# Patient Record
Sex: MALE | Race: WHITE | NOT HISPANIC OR LATINO
[De-identification: names, ages, dates, MRNs, and addresses within clinical notes are randomized per-mention and may not be internally consistent; named-entity substitution may affect disease eponyms.]

---

## 2017-06-08 ENCOUNTER — TRANSCRIPTION ENCOUNTER (OUTPATIENT)
Age: 52
End: 2017-06-08

## 2018-09-09 ENCOUNTER — EMERGENCY (EMERGENCY)
Facility: HOSPITAL | Age: 53
LOS: 1 days | Discharge: HOME | End: 2018-09-09
Admitting: EMERGENCY MEDICINE
Payer: COMMERCIAL

## 2018-09-09 VITALS
HEART RATE: 80 BPM | SYSTOLIC BLOOD PRESSURE: 135 MMHG | TEMPERATURE: 98 F | OXYGEN SATURATION: 99 % | DIASTOLIC BLOOD PRESSURE: 86 MMHG | RESPIRATION RATE: 18 BRPM

## 2018-09-09 LAB
ALBUMIN SERPL ELPH-MCNC: 3.8 G/DL — SIGNIFICANT CHANGE UP (ref 3.4–5)
ALP SERPL-CCNC: 66 U/L — SIGNIFICANT CHANGE UP (ref 40–120)
ALT FLD-CCNC: 56 U/L — HIGH (ref 12–42)
ANION GAP SERPL CALC-SCNC: 5 MMOL/L — LOW (ref 9–16)
APPEARANCE UR: CLEAR — SIGNIFICANT CHANGE UP
AST SERPL-CCNC: 34 U/L — SIGNIFICANT CHANGE UP (ref 15–37)
BILIRUB SERPL-MCNC: 0.5 MG/DL — SIGNIFICANT CHANGE UP (ref 0.2–1.2)
BILIRUB UR-MCNC: NEGATIVE — SIGNIFICANT CHANGE UP
BUN SERPL-MCNC: 13 MG/DL — SIGNIFICANT CHANGE UP (ref 7–23)
CALCIUM SERPL-MCNC: 8.9 MG/DL — SIGNIFICANT CHANGE UP (ref 8.5–10.5)
CHLORIDE SERPL-SCNC: 105 MMOL/L — SIGNIFICANT CHANGE UP (ref 96–108)
CO2 SERPL-SCNC: 30 MMOL/L — SIGNIFICANT CHANGE UP (ref 22–31)
COLOR SPEC: YELLOW — SIGNIFICANT CHANGE UP
CREAT SERPL-MCNC: 0.93 MG/DL — SIGNIFICANT CHANGE UP (ref 0.5–1.3)
DIFF PNL FLD: NEGATIVE — SIGNIFICANT CHANGE UP
GLUCOSE SERPL-MCNC: 105 MG/DL — HIGH (ref 70–99)
GLUCOSE UR QL: NEGATIVE — SIGNIFICANT CHANGE UP
HCT VFR BLD CALC: 45.5 % — SIGNIFICANT CHANGE UP (ref 39–50)
HGB BLD-MCNC: 15.3 G/DL — SIGNIFICANT CHANGE UP (ref 13–17)
KETONES UR-MCNC: NEGATIVE — SIGNIFICANT CHANGE UP
LEUKOCYTE ESTERASE UR-ACNC: NEGATIVE — SIGNIFICANT CHANGE UP
MCHC RBC-ENTMCNC: 29.3 PG — SIGNIFICANT CHANGE UP (ref 27–34)
MCHC RBC-ENTMCNC: 33.6 G/DL — SIGNIFICANT CHANGE UP (ref 32–36)
MCV RBC AUTO: 87.2 FL — SIGNIFICANT CHANGE UP (ref 80–100)
NITRITE UR-MCNC: NEGATIVE — SIGNIFICANT CHANGE UP
PH UR: 7.5 — SIGNIFICANT CHANGE UP (ref 5–8)
PLATELET # BLD AUTO: 191 K/UL — SIGNIFICANT CHANGE UP (ref 150–400)
POTASSIUM SERPL-MCNC: 3.8 MMOL/L — SIGNIFICANT CHANGE UP (ref 3.5–5.3)
POTASSIUM SERPL-SCNC: 3.8 MMOL/L — SIGNIFICANT CHANGE UP (ref 3.5–5.3)
PROT SERPL-MCNC: 7.6 G/DL — SIGNIFICANT CHANGE UP (ref 6.4–8.2)
PROT UR-MCNC: NEGATIVE MG/DL — SIGNIFICANT CHANGE UP
RBC # BLD: 5.22 M/UL — SIGNIFICANT CHANGE UP (ref 4.2–5.8)
RBC # FLD: 12.4 % — SIGNIFICANT CHANGE UP (ref 10.3–16.9)
SODIUM SERPL-SCNC: 140 MMOL/L — SIGNIFICANT CHANGE UP (ref 132–145)
SP GR SPEC: 1.01 — SIGNIFICANT CHANGE UP (ref 1–1.03)
UROBILINOGEN FLD QL: 0.2 E.U./DL — SIGNIFICANT CHANGE UP
WBC # BLD: 7.7 K/UL — SIGNIFICANT CHANGE UP (ref 3.8–10.5)
WBC # FLD AUTO: 7.7 K/UL — SIGNIFICANT CHANGE UP (ref 3.8–10.5)

## 2018-09-09 PROCEDURE — 99283 EMERGENCY DEPT VISIT LOW MDM: CPT

## 2018-09-09 RX ORDER — ONDANSETRON 8 MG/1
4 TABLET, FILM COATED ORAL ONCE
Qty: 0 | Refills: 0 | Status: COMPLETED | OUTPATIENT
Start: 2018-09-09 | End: 2018-09-09

## 2018-09-09 RX ORDER — TAMSULOSIN HYDROCHLORIDE 0.4 MG/1
0.4 CAPSULE ORAL ONCE
Qty: 0 | Refills: 0 | Status: COMPLETED | OUTPATIENT
Start: 2018-09-09 | End: 2018-09-09

## 2018-09-09 RX ORDER — SODIUM CHLORIDE 9 MG/ML
1000 INJECTION INTRAMUSCULAR; INTRAVENOUS; SUBCUTANEOUS ONCE
Qty: 0 | Refills: 0 | Status: COMPLETED | OUTPATIENT
Start: 2018-09-09 | End: 2018-09-09

## 2018-09-09 RX ADMIN — SODIUM CHLORIDE 2000 MILLILITER(S): 9 INJECTION INTRAMUSCULAR; INTRAVENOUS; SUBCUTANEOUS at 13:40

## 2018-09-09 NOTE — ED ADULT TRIAGE NOTE - CHIEF COMPLAINT QUOTE
Pt c/o bilateral flank pain last night. Frequency through out the night. This morning symptoms subsided and pt has been able to urinate without difficulty.

## 2018-09-09 NOTE — ED PROVIDER NOTE - OBJECTIVE STATEMENT
52 yo M with no known PMHx, presenting c/o flank pain and urinary hesitancy x 1d. Pt reports decreased po intake and hydration yesterday and was driving back to [facility] and noticed pain in b/l flank region with urinary hesitancy and dribbling.  Noted waves of sharp pain and nausea. Pain improved PTA to the ED and was able to void freely without any hesitation currently. Denies fever, chills, hematuria, penile d/c, abdominal pain, change in bowel function, flank pain, rash, HA, dizziness, SOB, CP, palpitations, diaphoresis, cough, and malaise.

## 2018-09-09 NOTE — ED ADULT NURSE NOTE - OBJECTIVE STATEMENT
Pt presents to ED c/o episode of b/l flank pain, urinary hesitancy and dribbling. Denies any bloody urine or penile discharge. Denies fever/chills, N/V, dizziness. Pt reports symptoms have resolved.

## 2018-09-09 NOTE — ED PROVIDER NOTE - MEDICAL DECISION MAKING DETAILS
pt with 1d of urinary hesitancy, dribbling, and flank pain, sx resolved PTA to the ED, AFVSS and non toxic appearing, labs and U/A wnl, tolerating po without N/V and voiding freely without discomfort, ?passed stone/renal colic vs BPH, encouraged supportive care, prompt f/u with PMD, strict return precautions discussed, pt verbalized understanding

## 2018-09-09 NOTE — ED ADULT NURSE NOTE - NSIMPLEMENTINTERV_GEN_ALL_ED
Implemented All Universal Safety Interventions:  [place] to call system. Call bell, personal items and telephone within reach. Instruct patient to call for assistance. Room bathroom lighting operational. Non-slip footwear when patient is off stretcher. Physically safe environment: no spills, clutter or unnecessary equipment. Stretcher in lowest position, wheels locked, appropriate side rails in place.

## 2018-09-09 NOTE — ED PROVIDER NOTE - PHYSICAL EXAMINATION
Vital Signs - nursing notes reviewed and confirmed  Gen - WDWN, NAD, comfortable and non-toxic appearing, speaking in full sentences   Skin - warm, dry, intact  HEENT - AT/NC, PERRL, EOMI, no conjunctival injection, moist oral mucosa, o/p clear with no erythema, edema, or exudate, uvula midline, airway patent, neck supple and NT, FROM  CV - S1S2, R/R/R  Resp - respiration non-labored, CTAB, symmetric bs b/l, no r/r/w  GI - NABS, soft, ND, NT, no rebound or guarding, +b/l CVAT   MS - w/w/p, no c/c/e, calves supple and NT, distal pulses symmetric b/l, brisk cap refills   Neuro - AxOx3, no focal neuro deficits, CN II-XII grossly intact, ambulatory without gait disturbance Vital Signs - nursing notes reviewed and confirmed  Gen - WDWN, NAD, comfortable and non-toxic appearing, speaking in full sentences   Skin - warm, dry, intact  HEENT - AT/NC, PERRL, EOMI, no conjunctival injection, dry oral mucosa, o/p clear with no erythema, edema, or exudate, uvula midline, airway patent, neck supple and NT, FROM  CV - S1S2, R/R/R  Resp - respiration non-labored, CTAB, symmetric bs b/l, no r/r/w  GI - NABS, soft, ND, NT, no rebound or guarding, minimal L sided CVAT   MS - w/w/p, no c/c/e, calves supple and NT, distal pulses symmetric b/l, brisk cap refills   Neuro - AxOx3, no focal neuro deficits, CN II-XII grossly intact, ambulatory without gait disturbance

## 2018-09-13 DIAGNOSIS — N23 UNSPECIFIED RENAL COLIC: ICD-10-CM

## 2018-09-13 DIAGNOSIS — R10.9 UNSPECIFIED ABDOMINAL PAIN: ICD-10-CM

## 2018-09-26 ENCOUNTER — APPOINTMENT (OUTPATIENT)
Dept: UROLOGY | Facility: CLINIC | Age: 53
End: 2018-09-26

## 2018-09-27 ENCOUNTER — APPOINTMENT (OUTPATIENT)
Dept: UROLOGY | Facility: CLINIC | Age: 53
End: 2018-09-27

## 2018-10-01 ENCOUNTER — APPOINTMENT (OUTPATIENT)
Dept: UROLOGY | Facility: CLINIC | Age: 53
End: 2018-10-01
Payer: COMMERCIAL

## 2018-10-01 VITALS
WEIGHT: 178 LBS | HEART RATE: 76 BPM | HEIGHT: 71 IN | OXYGEN SATURATION: 97 % | BODY MASS INDEX: 24.92 KG/M2 | TEMPERATURE: 98.3 F | SYSTOLIC BLOOD PRESSURE: 123 MMHG | DIASTOLIC BLOOD PRESSURE: 79 MMHG

## 2018-10-01 VITALS — WEIGHT: 172 LBS | BODY MASS INDEX: 23.99 KG/M2

## 2018-10-01 DIAGNOSIS — I25.2 OLD MYOCARDIAL INFARCTION: ICD-10-CM

## 2018-10-01 DIAGNOSIS — N52.9 MALE ERECTILE DYSFUNCTION, UNSPECIFIED: ICD-10-CM

## 2018-10-01 PROCEDURE — 51798 US URINE CAPACITY MEASURE: CPT

## 2018-10-01 PROCEDURE — 99204 OFFICE O/P NEW MOD 45 MIN: CPT | Mod: 25

## 2018-10-05 LAB
BACTERIA UR CULT: NORMAL
PSA FREE FLD-MCNC: 16.8
PSA FREE SERPL-MCNC: 0.56 NG/ML
PSA SERPL-MCNC: 3.33 NG/ML

## 2018-10-25 ENCOUNTER — APPOINTMENT (OUTPATIENT)
Dept: UROLOGY | Facility: CLINIC | Age: 53
End: 2018-10-25

## 2018-12-05 ENCOUNTER — APPOINTMENT (OUTPATIENT)
Dept: UROLOGY | Facility: CLINIC | Age: 53
End: 2018-12-05

## 2019-01-25 ENCOUNTER — APPOINTMENT (OUTPATIENT)
Dept: UROLOGY | Facility: CLINIC | Age: 54
End: 2019-01-25
Payer: COMMERCIAL

## 2019-01-25 VITALS
SYSTOLIC BLOOD PRESSURE: 120 MMHG | TEMPERATURE: 98.4 F | HEIGHT: 71 IN | WEIGHT: 172 LBS | BODY MASS INDEX: 24.08 KG/M2 | DIASTOLIC BLOOD PRESSURE: 78 MMHG | HEART RATE: 86 BPM

## 2019-01-25 PROCEDURE — 99214 OFFICE O/P EST MOD 30 MIN: CPT

## 2019-01-25 NOTE — PHYSICAL EXAM
[General Appearance - Well Developed] : well developed [General Appearance - Well Nourished] : well nourished [Normal Appearance] : normal appearance [Well Groomed] : well groomed [Abdomen Soft] : soft [Abdomen Tenderness] : non-tender [Abdomen Hernia] : no hernia was discovered [Costovertebral Angle Tenderness] : no ~M costovertebral angle tenderness [Urethral Meatus] : meatus normal [Penis Abnormality] : normal circumcised penis [Urinary Bladder Findings] : the bladder was normal on palpation [Scrotum] : the scrotum was normal [Epididymis] : the epididymides were normal [Testes Tenderness] : no tenderness of the testes [Testes Mass (___cm)] : there were no testicular masses [FreeTextEntry1] : left grade I varicocele 16cc x 2. [Skin Color & Pigmentation] : normal skin color and pigmentation [Heart Rate And Rhythm] : Heart rate and rhythm were normal [] : no respiratory distress [Oriented To Time, Place, And Person] : oriented to person, place, and time [Normal Station and Gait] : the gait and station were normal for the patient's age [No Focal Deficits] : no focal deficits [No Palpable Adenopathy] : no palpable adenopathy

## 2019-01-25 NOTE — HISTORY OF PRESENT ILLNESS
[FreeTextEntry1] : comes in with abnromal SA\par 1ml/14 mil/ml/14% motility/2% morphology\par girlfriend is 41F \par no children either partner\par not actilvely attemtping pregnancy\par \par starting this monring with gross hematuria\par no dysuria\par no fevers\par no flank pain\par +previous smoking history 4 pack year quit 25 years\par

## 2019-01-25 NOTE — ASSESSMENT
[FreeTextEntry1] : hypogonadsim\par T labs\par scrotal sonogram\par refer to IVF\par \par gross hematuria\par UA UCX cytology\par cysto\par CT urogram

## 2019-01-28 LAB
APPEARANCE: ABNORMAL
BACTERIA UR CULT: NORMAL
BACTERIA: NEGATIVE
BILIRUBIN URINE: ABNORMAL
BLOOD URINE: ABNORMAL
COLOR: ABNORMAL
GLUCOSE QUALITATIVE U: NEGATIVE
HYALINE CASTS: 0 /LPF
KETONES URINE: NEGATIVE
LEUKOCYTE ESTERASE URINE: ABNORMAL
MICROSCOPIC-UA: NORMAL
NITRITE URINE: NEGATIVE
PH URINE: 6
PROTEIN URINE: 30
RED BLOOD CELLS URINE: >720 /HPF
SPECIFIC GRAVITY URINE: 1.03
SQUAMOUS EPITHELIAL CELLS: 2 /HPF
UROBILINOGEN URINE: NEGATIVE
WHITE BLOOD CELLS URINE: 18 /HPF

## 2019-01-30 LAB — URINE CYTOLOGY: NORMAL

## 2019-02-03 ENCOUNTER — FORM ENCOUNTER (OUTPATIENT)
Age: 54
End: 2019-02-03

## 2019-02-04 ENCOUNTER — OUTPATIENT (OUTPATIENT)
Dept: OUTPATIENT SERVICES | Facility: HOSPITAL | Age: 54
LOS: 1 days | End: 2019-02-04
Payer: COMMERCIAL

## 2019-02-04 ENCOUNTER — APPOINTMENT (OUTPATIENT)
Dept: CT IMAGING | Facility: HOSPITAL | Age: 54
End: 2019-02-04
Payer: COMMERCIAL

## 2019-02-04 ENCOUNTER — APPOINTMENT (OUTPATIENT)
Dept: ULTRASOUND IMAGING | Facility: HOSPITAL | Age: 54
End: 2019-02-04
Payer: COMMERCIAL

## 2019-02-04 PROCEDURE — 76870 US EXAM SCROTUM: CPT | Mod: 26

## 2019-02-04 PROCEDURE — 74178 CT ABD&PLV WO CNTR FLWD CNTR: CPT | Mod: 26

## 2019-02-04 PROCEDURE — 74178 CT ABD&PLV WO CNTR FLWD CNTR: CPT

## 2019-02-04 PROCEDURE — 76870 US EXAM SCROTUM: CPT

## 2019-02-06 ENCOUNTER — RESULT REVIEW (OUTPATIENT)
Age: 54
End: 2019-02-06

## 2019-02-08 ENCOUNTER — APPOINTMENT (OUTPATIENT)
Dept: UROLOGY | Facility: CLINIC | Age: 54
End: 2019-02-08
Payer: COMMERCIAL

## 2019-02-08 VITALS — SYSTOLIC BLOOD PRESSURE: 135 MMHG | TEMPERATURE: 98.5 F | HEART RATE: 74 BPM | DIASTOLIC BLOOD PRESSURE: 81 MMHG

## 2019-02-08 DIAGNOSIS — R39.198 OTHER DIFFICULTIES WITH MICTURITION: ICD-10-CM

## 2019-02-08 PROCEDURE — 52000 CYSTOURETHROSCOPY: CPT

## 2019-02-08 PROCEDURE — 99213 OFFICE O/P EST LOW 20 MIN: CPT | Mod: 25

## 2019-02-08 RX ORDER — TADALAFIL 2.5 MG/1
2.5 TABLET, FILM COATED ORAL
Qty: 30 | Refills: 11 | Status: ACTIVE | COMMUNITY
Start: 2019-02-08 | End: 1900-01-01

## 2019-02-08 NOTE — HISTORY OF PRESENT ILLNESS
[FreeTextEntry1] : see attached cysto\par bothersome LUTS\par attempting conception\par await T labs\par small varicocele on sonogram

## 2019-02-08 NOTE — ASSESSMENT
[FreeTextEntry1] : BPH\par trial cialis 2.5\par \par hypogoandsim\par await T labs\par recommend IVF

## 2019-03-08 ENCOUNTER — APPOINTMENT (OUTPATIENT)
Dept: UROLOGY | Facility: CLINIC | Age: 54
End: 2019-03-08

## 2019-06-06 NOTE — ED ADULT TRIAGE NOTE - BP NONINVASIVE SYSTOLIC (MM HG)
Pt to specials via cart. No acute distress noted at this time. No further complaints. Tele pack intact. Monitor room aware.       Sandra Chirinos RN  06/06/19 0022 [id number]

## 2020-01-30 ENCOUNTER — EMERGENCY (EMERGENCY)
Facility: HOSPITAL | Age: 55
LOS: 1 days | Discharge: HOME | End: 2020-01-30
Admitting: EMERGENCY MEDICINE
Payer: COMMERCIAL

## 2020-01-30 VITALS
DIASTOLIC BLOOD PRESSURE: 83 MMHG | OXYGEN SATURATION: 98 % | HEART RATE: 83 BPM | SYSTOLIC BLOOD PRESSURE: 155 MMHG | RESPIRATION RATE: 18 BRPM

## 2020-01-30 VITALS
SYSTOLIC BLOOD PRESSURE: 143 MMHG | HEART RATE: 76 BPM | RESPIRATION RATE: 18 BRPM | TEMPERATURE: 98 F | HEIGHT: 61 IN | WEIGHT: 175.05 LBS | DIASTOLIC BLOOD PRESSURE: 90 MMHG

## 2020-01-30 DIAGNOSIS — R07.9 CHEST PAIN, UNSPECIFIED: ICD-10-CM

## 2020-01-30 DIAGNOSIS — R33.9 RETENTION OF URINE, UNSPECIFIED: ICD-10-CM

## 2020-01-30 LAB
ALBUMIN SERPL ELPH-MCNC: 4 G/DL — SIGNIFICANT CHANGE UP (ref 3.4–5)
ALP SERPL-CCNC: 71 U/L — SIGNIFICANT CHANGE UP (ref 40–120)
ALT FLD-CCNC: 97 U/L — HIGH (ref 12–42)
ANION GAP SERPL CALC-SCNC: 8 MMOL/L — LOW (ref 9–16)
APPEARANCE UR: CLEAR — SIGNIFICANT CHANGE UP
AST SERPL-CCNC: 46 U/L — HIGH (ref 15–37)
BASOPHILS # BLD AUTO: 0.02 K/UL — SIGNIFICANT CHANGE UP (ref 0–0.2)
BASOPHILS NFR BLD AUTO: 0.2 % — SIGNIFICANT CHANGE UP (ref 0–2)
BILIRUB SERPL-MCNC: 0.5 MG/DL — SIGNIFICANT CHANGE UP (ref 0.2–1.2)
BILIRUB UR-MCNC: NEGATIVE — SIGNIFICANT CHANGE UP
BUN SERPL-MCNC: 17 MG/DL — SIGNIFICANT CHANGE UP (ref 7–23)
CALCIUM SERPL-MCNC: 9.5 MG/DL — SIGNIFICANT CHANGE UP (ref 8.5–10.5)
CHLORIDE SERPL-SCNC: 105 MMOL/L — SIGNIFICANT CHANGE UP (ref 96–108)
CK MB BLD-MCNC: 0.66 % — SIGNIFICANT CHANGE UP
CK MB CFR SERPL CALC: 1.3 NG/ML — SIGNIFICANT CHANGE UP (ref 0.5–3.6)
CK SERPL-CCNC: 198 U/L — SIGNIFICANT CHANGE UP (ref 39–308)
CO2 SERPL-SCNC: 28 MMOL/L — SIGNIFICANT CHANGE UP (ref 22–31)
COLOR SPEC: YELLOW — SIGNIFICANT CHANGE UP
CREAT SERPL-MCNC: 0.97 MG/DL — SIGNIFICANT CHANGE UP (ref 0.5–1.3)
DIFF PNL FLD: NEGATIVE — SIGNIFICANT CHANGE UP
EOSINOPHIL # BLD AUTO: 0.25 K/UL — SIGNIFICANT CHANGE UP (ref 0–0.5)
EOSINOPHIL NFR BLD AUTO: 2.7 % — SIGNIFICANT CHANGE UP (ref 0–6)
FLU A RESULT: SIGNIFICANT CHANGE UP
FLU A RESULT: SIGNIFICANT CHANGE UP
FLUAV AG NPH QL: SIGNIFICANT CHANGE UP
FLUBV AG NPH QL: SIGNIFICANT CHANGE UP
GLUCOSE SERPL-MCNC: 117 MG/DL — HIGH (ref 70–99)
GLUCOSE UR QL: NEGATIVE — SIGNIFICANT CHANGE UP
HCT VFR BLD CALC: 43.7 % — SIGNIFICANT CHANGE UP (ref 39–50)
HGB BLD-MCNC: 14.7 G/DL — SIGNIFICANT CHANGE UP (ref 13–17)
IMM GRANULOCYTES NFR BLD AUTO: 0.1 % — SIGNIFICANT CHANGE UP (ref 0–1.5)
KETONES UR-MCNC: ABNORMAL MG/DL
LEUKOCYTE ESTERASE UR-ACNC: NEGATIVE — SIGNIFICANT CHANGE UP
LIDOCAIN IGE QN: 133 U/L — SIGNIFICANT CHANGE UP (ref 73–393)
LYMPHOCYTES # BLD AUTO: 1.29 K/UL — SIGNIFICANT CHANGE UP (ref 1–3.3)
LYMPHOCYTES # BLD AUTO: 13.9 % — SIGNIFICANT CHANGE UP (ref 13–44)
MAGNESIUM SERPL-MCNC: 1.9 MG/DL — SIGNIFICANT CHANGE UP (ref 1.6–2.6)
MCHC RBC-ENTMCNC: 29.1 PG — SIGNIFICANT CHANGE UP (ref 27–34)
MCHC RBC-ENTMCNC: 33.6 GM/DL — SIGNIFICANT CHANGE UP (ref 32–36)
MCV RBC AUTO: 86.4 FL — SIGNIFICANT CHANGE UP (ref 80–100)
MONOCYTES # BLD AUTO: 0.62 K/UL — SIGNIFICANT CHANGE UP (ref 0–0.9)
MONOCYTES NFR BLD AUTO: 6.7 % — SIGNIFICANT CHANGE UP (ref 2–14)
NEUTROPHILS # BLD AUTO: 7.08 K/UL — SIGNIFICANT CHANGE UP (ref 1.8–7.4)
NEUTROPHILS NFR BLD AUTO: 76.4 % — SIGNIFICANT CHANGE UP (ref 43–77)
NITRITE UR-MCNC: NEGATIVE — SIGNIFICANT CHANGE UP
NRBC # BLD: 0 /100 WBCS — SIGNIFICANT CHANGE UP (ref 0–0)
NT-PROBNP SERPL-SCNC: 40 PG/ML — SIGNIFICANT CHANGE UP
PH UR: 8.5 — HIGH (ref 5–8)
PLATELET # BLD AUTO: 170 K/UL — SIGNIFICANT CHANGE UP (ref 150–400)
POTASSIUM SERPL-MCNC: 3.6 MMOL/L — SIGNIFICANT CHANGE UP (ref 3.5–5.3)
POTASSIUM SERPL-SCNC: 3.6 MMOL/L — SIGNIFICANT CHANGE UP (ref 3.5–5.3)
PROT SERPL-MCNC: 7.5 G/DL — SIGNIFICANT CHANGE UP (ref 6.4–8.2)
PROT UR-MCNC: NEGATIVE MG/DL — SIGNIFICANT CHANGE UP
RBC # BLD: 5.06 M/UL — SIGNIFICANT CHANGE UP (ref 4.2–5.8)
RBC # FLD: 12.7 % — SIGNIFICANT CHANGE UP (ref 10.3–14.5)
RSV RESULT: SIGNIFICANT CHANGE UP
RSV RNA RESP QL NAA+PROBE: SIGNIFICANT CHANGE UP
SODIUM SERPL-SCNC: 141 MMOL/L — SIGNIFICANT CHANGE UP (ref 132–145)
SP GR SPEC: 1.01 — SIGNIFICANT CHANGE UP (ref 1–1.03)
TROPONIN I SERPL-MCNC: <0.017 NG/ML — LOW (ref 0.02–0.06)
UROBILINOGEN FLD QL: 0.2 E.U./DL — SIGNIFICANT CHANGE UP
WBC # BLD: 9.27 K/UL — SIGNIFICANT CHANGE UP (ref 3.8–10.5)
WBC # FLD AUTO: 9.27 K/UL — SIGNIFICANT CHANGE UP (ref 3.8–10.5)

## 2020-01-30 PROCEDURE — 71045 X-RAY EXAM CHEST 1 VIEW: CPT | Mod: 26

## 2020-01-30 PROCEDURE — 99285 EMERGENCY DEPT VISIT HI MDM: CPT

## 2020-01-30 PROCEDURE — 93010 ELECTROCARDIOGRAM REPORT: CPT

## 2020-01-30 RX ORDER — ASPIRIN/CALCIUM CARB/MAGNESIUM 324 MG
162 TABLET ORAL ONCE
Refills: 0 | Status: COMPLETED | OUTPATIENT
Start: 2020-01-30 | End: 2020-01-30

## 2020-01-30 RX ORDER — SODIUM CHLORIDE 9 MG/ML
1000 INJECTION INTRAMUSCULAR; INTRAVENOUS; SUBCUTANEOUS ONCE
Refills: 0 | Status: COMPLETED | OUTPATIENT
Start: 2020-01-30 | End: 2020-01-30

## 2020-01-30 RX ADMIN — Medication 162 MILLIGRAM(S): at 12:51

## 2020-01-30 RX ADMIN — SODIUM CHLORIDE 1000 MILLILITER(S): 9 INJECTION INTRAMUSCULAR; INTRAVENOUS; SUBCUTANEOUS at 12:51

## 2020-01-30 NOTE — ED PROVIDER NOTE - OBJECTIVE STATEMENT
55 y/o M with PMHx "MI in 2003", however states he had a negative catheter with no stents placed and was told it was most likely vasospasm. He follows with cardiologist Dr Granados ([phone]) regularly. He also has Hx HLD and is on a statin, takes aspirin daily, and is a non-smoker. He presents to the ED c/o generalized weakness today with dry mouth, cold feet, tightness around the neck, and soreness to b/l shoulders and elbows R>L. Pt denies current chest pain and feels dehydrated and anxious. 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 Of note, pt took 2 baby aspirin PTA. Denies recent travel, cocaine use, other illicit drug use, FHx significant cardiac events, or exertional sx. Denies fever, chills, N/V, abd pain, SOB, back pain, or neck pain. 55 y/o M with PMHx "MI in 2003", however states he had a negative cardiac catheter with no stents placed and was told it was most likely vasospasm. He follows with cardiologist Dr Granados ([phone]) regularly. He also has Hx HLD and is on a statin, takes aspirin daily, and is a non-smoker. He presents to the ED c/o generalized weakness today with dry mouth, cold feet, tightness around the neck, and soreness to b/l shoulders and elbows R>L. Pt denies current chest pain and feels dehydrated and anxious. 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 Of note, pt took 2 baby aspirin PTA. Denies recent travel, cocaine use, other illicit drug use, FHx significant cardiac events, or exertional sx. Denies fever, chills, N/V, abd pain, SOB, back pain, or neck pain. 53 y/o M with PMHx "MI in 2003" -- states he had a negative cardiac catheter with no stents placed, was told it was most likely vasospasm (had positive trop in the past). follows with cardiologist Dr Granados ([phone]) regularly.  Hx HLD and is on a statin, takes aspirin daily, non-smoker. hx enlarged prostate.  c/o generalized weakness today with dry mouth, cold feet, tightness around the neck, and soreness to b/l shoulders and elbows R>L. Pt denies current chest pain and feels dehydrated and anxious. 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 Of note, pt took 2 baby aspirin PTA. Denies recent travel, cocaine use, other illicit drug use, FHx significant cardiac events, or exertional sx. Denies fever, chills, N/V, abd pain, SOB, back pain, or neck pain.

## 2020-01-30 NOTE — ED ADULT NURSE NOTE - CHPI ED NUR SYMPTOMS NEG
no chills/no fever/no nausea/no syncope/no diaphoresis/no vomiting/no congestion/no back pain/no chest pain

## 2020-01-30 NOTE — ED ADULT TRIAGE NOTE - CHIEF COMPLAINT QUOTE
pt biba for c/o chest/shoulder/right arm pain, accompanied by weakness and nausea. took two baby ASA at home. hx hyperlipidemia and MI. ekg done in field and unremarkable.

## 2020-01-30 NOTE — ED ADULT NURSE NOTE - NS ED NOTE ABUSE SUSPICION NEGLECT YN
9/5/2019    Patient: Karis Briceño    YOB: 1942    To whom it may concern,    Karis Briceño was admitted to the hospital under my care on 9/3/2019 and was discharged on 9/5/2019.  Cleared to return to work on Monday 9/9/2019 without restrictions.     If you have any questions or concerns, please don't hesitate to contact the department of hospital medicine at [phone]    Sincerely,            Sophie [facility]Cooper [facility]  Department of Hospital Medicine  
No

## 2020-01-30 NOTE — ED PROVIDER NOTE - CHPI ED SYMPTOMS POS
COUGH/WEAKNESS/dry mouth, cold feet, neck tightness, shoulder and elbow soreness, dehyrdrated, anxious

## 2020-01-30 NOTE — ED PROVIDER NOTE - PROGRESS NOTE DETAILS
after 1L NS in ED, patient noted to have urinary retention, patient unable to void on own. bedside US shows full bladder. patient recommended forde, patient wants to wait and thinks this may have been due to constipation however went to bathroom and had BM, still unable to urinate. history of enlarged prostate on previous CT. patient eventually agreed to straight cath, straight cath with 500cc urine returned, tolerating PO. refused forde with leg bag, he understands straight cath is temporizing measure and most likely will need to return later to ED for forde as he may experience urinary retention when he goes home. he verbalized understanding and wishes to go home, partner at bedside present. he states he will follow up with his urologist. after 1L NS in ED, patient c/o inability to urinate, noted to have urinary retention, patient unable to void on own. bedside US shows full bladder. patient recommended forde, patient wants to wait and thinks this may have been due to constipation however went to bathroom and had BM, still unable to urinate. history of enlarged prostate on previous CT. patient eventually agreed to straight cath, straight cath with 500cc urine returned, tolerating PO. refused forde with leg bag, he understands straight cath is temporizing measure and most likely will need to return later to ED for forde as he may experience urinary retention when he goes home. he verbalized understanding and wishes to go home, partner at bedside present. he states he will follow up with his urologist.

## 2020-01-30 NOTE — ED PROVIDER NOTE - NSFOLLOWUPINSTRUCTIONS_ED_ALL_ED_FT
Please follow up with your urologist as soon as possible for further evaluation  Please follow up with your cardiologist and/or primary care provider as well    RETURN TO THE EMERGENCY DEPARTMENT FOR ANY CONCERNING OR WORSENING SYMPTOMS INCLUDING UNABLE TO URINATE, FEVER, VOMITING, WORSENING CHEST PAIN, SHORTNESS OF BREATH OR ANY CONCERNS.

## 2020-01-30 NOTE — ED PROVIDER NOTE - PATIENT PORTAL LINK FT
You can access the FollowMyHealth Patient Portal offered by [facility] by registering at the following website: [email or web address]. By joining [employer]’s FollowMyHealth portal, you will also be able to view your health information using other applications (apps) compatible with our system.

## 2020-01-30 NOTE — ED ADULT NURSE NOTE - NSIMPLEMENTINTERV_GEN_ALL_ED
Implemented All Universal Safety Interventions:  [place] to call system. Call bell, personal items and telephone within reach. Instruct patient to call for assistance. Room bathroom lighting operational. Non-slip footwear when patient is off stretcher. Physically safe environment: no spills, clutter or unnecessary equipment. Stretcher in lowest position, wheels locked, appropriate side rails in place.

## 2020-01-30 NOTE — ED ADULT NURSE NOTE - OBJECTIVE STATEMENT
55 yo M c.o chest tightness. Pt state "I was fixing light fixtures in the house and then I started having soreness in my arms so I sat down to relax and then I started feeling panicky and started feeling tightness in my chest and dizzy, I had a heart attack in 2013 so I was scared and come in". Pt denies  tingling to bl upper and lower extremities at this time. speaking full sentences without difficulty. Telemetry and cont pulse ox monitoring initiated. Will continue to monitor.

## 2020-01-31 ENCOUNTER — APPOINTMENT (OUTPATIENT)
Dept: UROLOGY | Facility: CLINIC | Age: 55
End: 2020-01-31
Payer: COMMERCIAL

## 2020-01-31 VITALS — HEART RATE: 86 BPM | SYSTOLIC BLOOD PRESSURE: 114 MMHG | DIASTOLIC BLOOD PRESSURE: 70 MMHG | TEMPERATURE: 98.4 F

## 2020-01-31 PROBLEM — I21.9 ACUTE MYOCARDIAL INFARCTION, UNSPECIFIED: Chronic | Status: ACTIVE | Noted: 2020-01-30

## 2020-01-31 PROCEDURE — 99213 OFFICE O/P EST LOW 20 MIN: CPT

## 2020-01-31 NOTE — ASSESSMENT
[FreeTextEntry1] : BPH\par retention\par trial alfuzosin\par will do PSA testing in 6-8 weeks given cath yesterday\par \par left varicocele\par considering pregnancy with his girlfriend\par i recommend IVF\par they are unclear if they want to go down the IVF route\par will discuss and get back toit

## 2020-01-31 NOTE — HISTORY OF PRESENT ILLNESS
[FreeTextEntry1] : seen in ER yesterday with panic attack\par given IV fluids\par went into retention\par straight cath 700+cc residual noted\par now voiding more comfortably\par previous cysto with large intravescial lobe and large lateral lobes\par PSA 10/2018 3.3\par not routinely taking cialis\par partner is 42F still considering pregnancy\par hx OATS\par

## 2020-02-03 ENCOUNTER — TRANSCRIPTION ENCOUNTER (OUTPATIENT)
Age: 55
End: 2020-02-03

## 2020-02-28 ENCOUNTER — APPOINTMENT (OUTPATIENT)
Dept: UROLOGY | Facility: CLINIC | Age: 55
End: 2020-02-28

## 2020-09-25 ENCOUNTER — EMERGENCY (EMERGENCY)
Facility: HOSPITAL | Age: 55
LOS: 1 days | Discharge: HOME | End: 2020-09-25
Attending: EMERGENCY MEDICINE | Admitting: EMERGENCY MEDICINE
Payer: COMMERCIAL

## 2020-09-25 VITALS
SYSTOLIC BLOOD PRESSURE: 150 MMHG | HEART RATE: 88 BPM | OXYGEN SATURATION: 95 % | DIASTOLIC BLOOD PRESSURE: 100 MMHG | WEIGHT: 171.96 LBS | HEIGHT: 71 IN | TEMPERATURE: 98 F | RESPIRATION RATE: 16 BRPM

## 2020-09-25 PROBLEM — E78.5 HYPERLIPIDEMIA, UNSPECIFIED: Chronic | Status: ACTIVE | Noted: 2020-01-30

## 2020-09-25 PROCEDURE — 99284 EMERGENCY DEPT VISIT MOD MDM: CPT

## 2020-09-25 RX ORDER — GABAPENTIN 400 MG/1
1 CAPSULE ORAL
Qty: 21 | Refills: 0
Start: 2020-09-25 | End: 2020-10-01

## 2020-09-25 RX ORDER — OXYCODONE AND ACETAMINOPHEN 5; 325 MG/1; MG/1
1 TABLET ORAL ONCE
Refills: 0 | Status: DISCONTINUED | OUTPATIENT
Start: 2020-09-25 | End: 2020-09-25

## 2020-09-25 RX ORDER — LIDOCAINE 4 G/100G
1 CREAM TOPICAL ONCE
Refills: 0 | Status: COMPLETED | OUTPATIENT
Start: 2020-09-25 | End: 2020-09-25

## 2020-09-25 RX ORDER — IBUPROFEN 200 MG
1 TABLET ORAL
Qty: 15 | Refills: 0
Start: 2020-09-25 | End: 2020-09-29

## 2020-09-25 RX ORDER — IBUPROFEN 200 MG
800 TABLET ORAL ONCE
Refills: 0 | Status: COMPLETED | OUTPATIENT
Start: 2020-09-25 | End: 2020-09-25

## 2020-09-25 RX ORDER — CYCLOBENZAPRINE HYDROCHLORIDE 10 MG/1
1 TABLET, FILM COATED ORAL
Qty: 15 | Refills: 0
Start: 2020-09-25 | End: 2020-09-29

## 2020-09-25 RX ORDER — CYCLOBENZAPRINE HYDROCHLORIDE 10 MG/1
10 TABLET, FILM COATED ORAL ONCE
Refills: 0 | Status: COMPLETED | OUTPATIENT
Start: 2020-09-25 | End: 2020-09-25

## 2020-09-25 RX ADMIN — LIDOCAINE 1 PATCH: 4 CREAM TOPICAL at 11:52

## 2020-09-25 RX ADMIN — CYCLOBENZAPRINE HYDROCHLORIDE 10 MILLIGRAM(S): 10 TABLET, FILM COATED ORAL at 11:52

## 2020-09-25 RX ADMIN — Medication 800 MILLIGRAM(S): at 11:52

## 2020-09-25 RX ADMIN — Medication 50 MILLIGRAM(S): at 11:53

## 2020-09-25 RX ADMIN — OXYCODONE AND ACETAMINOPHEN 1 TABLET(S): 5; 325 TABLET ORAL at 11:52

## 2020-09-25 NOTE — ED PROVIDER NOTE - NSFOLLOWUPINSTRUCTIONS_ED_ALL_ED_FT
Log Out.      [employer] CareNotes®     :  [facility]  	                       LUMBAR RADICULOPATHY - AfterCare(R) Instructions(ER/ED)           Lumbar Radiculopathy    WHAT YOU NEED TO KNOW:    Lumbar radiculopathy is a painful condition that happens when a nerve in your lumbar spine (lower back) is pinched or irritated. Nerves control feeling and movement in your body. You may have numbness or pain that shoots down from your lower back towards your foot.    DISCHARGE INSTRUCTIONS:    Medicines:   •Medicines:?NSAIDs, such as ibuprofen, help decrease swelling, pain, and fever. This medicine is available with or without a doctor's order. NSAIDs can cause stomach bleeding or kidney problems in certain people. If you take blood thinner medicine, always ask your healthcare provider if NSAIDs are safe for you. Always read the medicine label and follow directions.      ?Muscle relaxers help decrease pain and muscle spasms.      ?Opioids: This is a strong medicine given to reduce severe pain. It is also called narcotic pain medicine. Take this medicine exactly as directed by your healthcare provider.      ?Oral steroids: Steroids may also be given to reduce pain and swelling.      ?Take your medicine as directed. Contact your healthcare provider if you think your medicine is not helping or if you have side effects. Tell him of her if you are allergic to any medicine. Keep a list of the medicines, vitamins, and herbs you take. Include the amounts, and when and why you take them. Bring the list or the pill bottles to follow-up visits. Carry your medicine list with you in case of an emergency.          Follow up with your healthcare provider or spine specialist within 1 to 3 weeks: After your first follow-up appointment, return to your healthcare provider or spine specialist every 2 weeks until you have healed. Ask for information about physical therapy for your condition. Write down your questions so you remember to ask them during your visits.    Physical therapy: You may need physical therapy to improve your condition. Your physical therapist may teach you certain exercises to improve posture (the way you stand and sit), flexibility, and strength in your lower back.     Self care:   •Stay active: It is best to be active when you have lumbar radiculopathy. Your physical therapist or healthcare provider may tell you to take walks to ease yourself back into your daily routine. Avoid long periods of bed rest. Bed rest could worsen your symptoms. Do not move in ways that increase your pain. Ask for more information about the best ways to stay active.       •Use ice or heat packs: Use ice or heat packs as directed on the sore area of your body to decrease the pain and swelling. Put ice in a plastic bag covered with a towel on your low back. Cover heated items with a towel to avoid burns. Use ice and heat as directed.      •Avoid heavy lifting: Your condition may worsen if you lift heavy things. Avoid lifting if possible.      •Maintain a healthy weight: Excess body weight may strain your back. Talk with your healthcare provider about ways to lose excess weight if you are overweight.       Contact your healthcare provider or spine specialist if:   •Your pain does not improve within 1 to 3 weeks after treatment.      •Your pain and weakness keep you from your normal activities at work, home, or school.      •You lose more than 10 pounds in 6 months without trying.      •You become depressed or sad because of the pain.      •You have questions or concerns about your condition or care.      Return to the emergency department if:   •You have a fever greater than 100.4°F for longer than 2 days.      •You have new, severe back or leg pain, or your pain spreads to both legs.      •You have any new signs of numbness or weakness, especially in your lower back, legs, arms, or genital area.      •You have new trouble controlling your urine and bowel movements.      •You do not feel like your bladder empties when you urinate.

## 2020-09-25 NOTE — ED PROVIDER NOTE - PATIENT PORTAL LINK FT
You can access the FollowMyHealth Patient Portal offered by [facility] by registering at the following website: [email or web address]. By joining [employer]’s FollowMyHealth portal, you will also be able to view your health information using other applications (apps) compatible with our system.

## 2020-09-25 NOTE — ED PROVIDER NOTE - CLINICAL SUMMARY MEDICAL DECISION MAKING FREE TEXT BOX
56 y/o M presenting with R sided lumbar paraspinal pain that travels down the buttock. Exam is remarkable for some tenderness over the R lumbar paraspinal region and superior buttock. Plan for pain management. Anticipate D/C home afterwards.

## 2020-09-25 NOTE — ED PROVIDER NOTE - CARE PROVIDER_API CALL
Jerald Meehan)  Neurosurgery  [address] [place] [address]  Phone: [phone]  Fax: [phone]  Follow Up Time:     Teddy Casey; MS)  Neurosurgery  [address] [place] [address]  Phone: [phone]  Fax: [phone]  Follow Up Time:

## 2020-09-25 NOTE — ED PROVIDER NOTE - PHYSICAL EXAMINATION
VITAL SIGNS: I have reviewed nursing notes and confirm.  CONSTITUTIONAL: Well-developed; well-nourished; in no acute distress.  SKIN: Skin exam is warm and dry, no acute rash.  HEAD: Normocephalic; atraumatic.  EYES: PERRL, EOM intact; conjunctiva and sclera clear.  ENT: No nasal discharge; airway clear.  NECK: Supple; non tender.  CARD: S1, S2 normal; no murmurs, gallops, or rubs. Regular rate and rhythm.  RESP: Unlabored. No wheezes, rales or rhonchi.  ABD: soft; non-distended; non-tender  MSK: No midline tenderness. Some tenderness over the R lumbar paraspinal region and superior buttock. No overlying skin changes.   LYMPH: No acute cervical adenopathy.  NEURO: Alert, oriented. Grossly unremarkable. Normal gait. Extremity sensations and motor intact.   PSYCH: Cooperative, appropriate.

## 2020-09-25 NOTE — ED PROVIDER NOTE - CARE PROVIDERS DIRECT ADDRESSES
[email or web address]

## 2020-09-25 NOTE — ED PROVIDER NOTE - OBJECTIVE STATEMENT
56 y/o M with remote PMHx of of back pain (no prior surgeries or imaging done) presents to the ED for worsening R sided lumbar paraspinal pain that travels down the buttock. Pt reports he has been experiencing symptoms for the past few days. He also endorses intermittent paresthesia versus numbness in the thigh. He also has a Hx of BPH and some urinary hesitancy. However, he is still voiding normally. 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   He denies changes in urination, recent heavy lifting, falls, fevers, chills, coughs and recent illnesses.

## 2020-10-06 PROBLEM — N40.0 BENIGN PROSTATIC HYPERPLASIA WITHOUT LOWER URINARY TRACT SYMPTOMS: Chronic | Status: ACTIVE | Noted: 2020-09-25

## 2020-10-08 ENCOUNTER — APPOINTMENT (OUTPATIENT)
Dept: UROLOGY | Facility: CLINIC | Age: 55
End: 2020-10-08
Payer: COMMERCIAL

## 2020-10-08 ENCOUNTER — APPOINTMENT (OUTPATIENT)
Dept: SPINE | Facility: CLINIC | Age: 55
End: 2020-10-08

## 2020-10-08 VITALS — SYSTOLIC BLOOD PRESSURE: 125 MMHG | DIASTOLIC BLOOD PRESSURE: 80 MMHG | HEART RATE: 67 BPM | TEMPERATURE: 98.2 F

## 2020-10-08 DIAGNOSIS — R31.0 GROSS HEMATURIA: ICD-10-CM

## 2020-10-08 PROCEDURE — 99213 OFFICE O/P EST LOW 20 MIN: CPT

## 2020-10-08 RX ORDER — ALFUZOSIN HYDROCHLORIDE 10 MG/1
10 TABLET, EXTENDED RELEASE ORAL DAILY
Qty: 30 | Refills: 11 | Status: DISCONTINUED | COMMUNITY
Start: 2020-01-31 | End: 2020-10-08

## 2020-10-08 NOTE — ASSESSMENT
[FreeTextEntry1] : gross hematuria\par UA UCX cytology\par freee and totla PSA\par start fiansteride 5\par renal ultrasound\par f/u 3 motnsh

## 2020-10-08 NOTE — HISTORY OF PRESENT ILLNESS
[FreeTextEntry1] : returns for follow up\par repeat epsidoe of gross heamturai\par +back pain few weeks ago which led to ER visit - +Sciatica\par prevnious negative hematuria workup 2.2019\par +prostatoemgaly\par no nausea vomting\par no dysruia\par previous history urinary reteiont

## 2021-06-16 ENCOUNTER — NON-APPOINTMENT (OUTPATIENT)
Age: 56
End: 2021-06-16

## 2021-06-16 ENCOUNTER — OUTPATIENT (OUTPATIENT)
Dept: OUTPATIENT SERVICES | Facility: HOSPITAL | Age: 56
LOS: 1 days | End: 2021-06-16
Payer: COMMERCIAL

## 2021-06-16 DIAGNOSIS — I10 ESSENTIAL (PRIMARY) HYPERTENSION: ICD-10-CM

## 2021-06-16 DIAGNOSIS — R94.31 ABNORMAL ELECTROCARDIOGRAM [ECG] [EKG]: ICD-10-CM

## 2021-06-16 DIAGNOSIS — R07.9 CHEST PAIN, UNSPECIFIED: ICD-10-CM

## 2021-06-16 DIAGNOSIS — Z82.49 FAMILY HISTORY OF ISCHEMIC HEART DISEASE AND OTHER DISEASES OF THE CIRCULATORY SYSTEM: ICD-10-CM

## 2021-06-16 DIAGNOSIS — Z87.891 PERSONAL HISTORY OF NICOTINE DEPENDENCE: ICD-10-CM

## 2021-06-16 DIAGNOSIS — R33.8 OTHER RETENTION OF URINE: ICD-10-CM

## 2021-06-16 DIAGNOSIS — I25.10 ATHEROSCLEROTIC HEART DISEASE OF NATIVE CORONARY ARTERY WITHOUT ANGINA PECTORIS: ICD-10-CM

## 2021-06-16 DIAGNOSIS — I25.2 OLD MYOCARDIAL INFARCTION: ICD-10-CM

## 2021-06-16 DIAGNOSIS — N40.1 BENIGN PROSTATIC HYPERPLASIA WITH LOWER URINARY TRACT SYMPTOMS: ICD-10-CM

## 2021-06-16 PROCEDURE — 93458 L HRT ARTERY/VENTRICLE ANGIO: CPT | Mod: 26

## 2021-06-16 PROCEDURE — 93010 ELECTROCARDIOGRAM REPORT: CPT | Mod: 76

## 2021-06-16 PROCEDURE — 99223 1ST HOSP IP/OBS HIGH 75: CPT

## 2021-06-16 PROCEDURE — 76770 US EXAM ABDO BACK WALL COMP: CPT | Mod: 26

## 2021-06-16 PROCEDURE — 99284 EMERGENCY DEPT VISIT MOD MDM: CPT

## 2021-06-18 ENCOUNTER — APPOINTMENT (OUTPATIENT)
Dept: UROLOGY | Facility: CLINIC | Age: 56
End: 2021-06-18
Payer: COMMERCIAL

## 2021-06-18 ENCOUNTER — APPOINTMENT (OUTPATIENT)
Dept: CARDIOLOGY | Facility: CLINIC | Age: 56
End: 2021-06-18

## 2021-06-18 VITALS
HEIGHT: 71 IN | DIASTOLIC BLOOD PRESSURE: 79 MMHG | BODY MASS INDEX: 24.5 KG/M2 | WEIGHT: 175 LBS | HEART RATE: 81 BPM | SYSTOLIC BLOOD PRESSURE: 117 MMHG | TEMPERATURE: 98 F

## 2021-06-18 PROCEDURE — 99214 OFFICE O/P EST MOD 30 MIN: CPT

## 2021-06-18 PROCEDURE — 99072 ADDL SUPL MATRL&STAF TM PHE: CPT

## 2021-06-18 NOTE — HISTORY OF PRESENT ILLNESS
[FreeTextEntry1] : recent admission to ER with urinary retention\par forde inserted\par did not start tamsulosin \par remains on fiansteride\par here considering voiding trial\par +constipation\par prevous retention episode\par has held finasteride for a long time\par +bladder spasms

## 2021-06-18 NOTE — ASSESSMENT
[FreeTextEntry1] : magdy retenion\par to trial flomax \par treat constipation\par f/u monday for voding trial\par f/u in office 1 month later

## 2021-06-21 ENCOUNTER — APPOINTMENT (OUTPATIENT)
Dept: UROLOGY | Facility: CLINIC | Age: 56
End: 2021-06-21
Payer: COMMERCIAL

## 2021-06-21 VITALS — DIASTOLIC BLOOD PRESSURE: 76 MMHG | HEART RATE: 92 BPM | SYSTOLIC BLOOD PRESSURE: 118 MMHG | TEMPERATURE: 98 F

## 2021-06-21 PROCEDURE — 99072 ADDL SUPL MATRL&STAF TM PHE: CPT

## 2021-06-21 PROCEDURE — 99213 OFFICE O/P EST LOW 20 MIN: CPT

## 2021-06-21 NOTE — PHYSICAL EXAM
Informed patient daughter (Lisa) there are no available programs to assist with medication on profile or pay her co-pays.  I gave the patient a coupon to get a 30 day supply of Methenamine for $39.38.    [General Appearance - Well Developed] : well developed [General Appearance - Well Nourished] : well nourished [Normal Appearance] : normal appearance [Well Groomed] : well groomed [General Appearance - In No Acute Distress] : no acute distress [Abdomen Soft] : soft [Abdomen Tenderness] : non-tender [Costovertebral Angle Tenderness] : no ~M costovertebral angle tenderness [Edema] : no peripheral edema [] : no respiratory distress [Respiration, Rhythm And Depth] : normal respiratory rhythm and effort [Exaggerated Use Of Accessory Muscles For Inspiration] : no accessory muscle use [Oriented To Time, Place, And Person] : oriented to person, place, and time [Affect] : the affect was normal [Mood] : the mood was normal [Not Anxious] : not anxious [No Focal Deficits] : no focal deficits

## 2021-06-21 NOTE — ASSESSMENT
[FreeTextEntry1] : 56 yo male w/urinary retention\par - Catheter removed today intact without difficulty\par - Patient voided 125cc\par - PVR was 24cc\par - Continue tamsulosin\par - Advised patient if he develops difficulty urinating to go to the hospital immediately\par - F/U in 1 month

## 2021-06-21 NOTE — HISTORY OF PRESENT ILLNESS
[FreeTextEntry1] : 56 yo male with a recent ER admission for urinary retention s/p forde catheter placement \par Patient reports he feels well and the catheter has been draining appropriately \par Denies abdominal pain, N/V, and fever/chills.\par Currently on Tamsulosin 0.4mg

## 2021-07-25 ENCOUNTER — TRANSCRIPTION ENCOUNTER (OUTPATIENT)
Age: 56
End: 2021-07-25

## 2021-07-27 ENCOUNTER — APPOINTMENT (OUTPATIENT)
Dept: UROLOGY | Facility: CLINIC | Age: 56
End: 2021-07-27

## 2021-07-28 ENCOUNTER — APPOINTMENT (OUTPATIENT)
Dept: UROLOGY | Facility: CLINIC | Age: 56
End: 2021-07-28
Payer: COMMERCIAL

## 2021-07-28 VITALS — TEMPERATURE: 97.6 F | SYSTOLIC BLOOD PRESSURE: 110 MMHG | HEART RATE: 60 BPM | DIASTOLIC BLOOD PRESSURE: 72 MMHG

## 2021-07-28 DIAGNOSIS — R35.1 BENIGN PROSTATIC HYPERPLASIA WITH LOWER URINARY TRACT SYMPMS: ICD-10-CM

## 2021-07-28 DIAGNOSIS — N40.1 BENIGN PROSTATIC HYPERPLASIA WITH LOWER URINARY TRACT SYMPMS: ICD-10-CM

## 2021-07-28 DIAGNOSIS — R33.8 OTHER RETENTION OF URINE: ICD-10-CM

## 2021-07-28 PROCEDURE — 51798 US URINE CAPACITY MEASURE: CPT

## 2021-07-28 PROCEDURE — 99024 POSTOP FOLLOW-UP VISIT: CPT

## 2021-07-28 NOTE — ASSESSMENT
[FreeTextEntry1] : Urinary retention\par s/p TOV last month\par PVR 0 ml - to day to r/o urinary retention\par Feels good, no problems voiding\par \par BPH with Nocturia\par reviewed causes and treatment options\par Continue Tamsulosin 0.4 mg and Finasteride 5 mg. \par Reviewed medication use and s/e\par \par Follow up in 6 months \par

## 2021-07-28 NOTE — PHYSICAL EXAM

## 2021-07-28 NOTE — HISTORY OF PRESENT ILLNESS
[Urinary Urgency] : urinary urgency [Urinary Frequency] : urinary frequency [None] : None [FreeTextEntry1] : This is a 55 year old male with  history of  urinary retention\par Presents to clinic for one month follow up for PVR. \par Reports feeling good, voiding well. \par Improved symptoms, good urinary FOS after TOV  last month \par On Tamsulosin 0.4 mg and Finasteride 5 mg. \par Denies dysuria, hematuria, flank pain.\par  [Urinary Incontinence] : no urinary incontinence [Urinary Retention] : no urinary retention [Nocturia] : no nocturia [Straining] : no straining [Weak Stream] : no weak stream [Intermittency] : no intermittency [Post-Void Dribbling] : no post-void dribbling [Erectile Dysfunction] : no Erectile Dysfunction [Dysuria] : no dysuria [Hematuria - Gross] : no gross hematuria [Hematuria - Microscopic] : no microscopic hematuria [Bladder Spasm] : no bladder spasm [Abdominal Pain] : no abdominal pain [Flank Pain] : no flank pain [Edema] : ~T edema was not present [Fever] : no fever [Fatigue] : no fatigue [Nausea] : no nausea [Anorexia] : no anorexia

## 2021-07-30 ENCOUNTER — EMERGENCY (EMERGENCY)
Facility: HOSPITAL | Age: 56
LOS: 1 days | Discharge: HOME | End: 2021-07-30
Attending: EMERGENCY MEDICINE | Admitting: EMERGENCY MEDICINE
Payer: COMMERCIAL

## 2021-07-30 VITALS
HEART RATE: 80 BPM | RESPIRATION RATE: 18 BRPM | HEIGHT: 71 IN | TEMPERATURE: 98 F | OXYGEN SATURATION: 97 % | SYSTOLIC BLOOD PRESSURE: 129 MMHG | WEIGHT: 179.9 LBS | DIASTOLIC BLOOD PRESSURE: 74 MMHG

## 2021-07-30 DIAGNOSIS — M70.22 OLECRANON BURSITIS, LEFT ELBOW: ICD-10-CM

## 2021-07-30 DIAGNOSIS — E78.5 HYPERLIPIDEMIA, UNSPECIFIED: ICD-10-CM

## 2021-07-30 DIAGNOSIS — Z86.79 PERSONAL HISTORY OF OTHER DISEASES OF THE CIRCULATORY SYSTEM: ICD-10-CM

## 2021-07-30 DIAGNOSIS — M25.522 PAIN IN LEFT ELBOW: ICD-10-CM

## 2021-07-30 DIAGNOSIS — N40.1 BENIGN PROSTATIC HYPERPLASIA WITH LOWER URINARY TRACT SYMPTOMS: ICD-10-CM

## 2021-07-30 PROCEDURE — 99283 EMERGENCY DEPT VISIT LOW MDM: CPT

## 2021-07-30 PROCEDURE — 73080 X-RAY EXAM OF ELBOW: CPT | Mod: 26,LT

## 2021-07-30 RX ORDER — INDOMETHACIN 50 MG
1 CAPSULE ORAL
Qty: 21 | Refills: 0
Start: 2021-07-30 | End: 2021-08-05

## 2021-07-30 RX ORDER — INDOMETHACIN 50 MG
25 CAPSULE ORAL ONCE
Refills: 0 | Status: COMPLETED | OUTPATIENT
Start: 2021-07-30 | End: 2021-07-30

## 2021-07-30 RX ADMIN — Medication 25 MILLIGRAM(S): at 12:34

## 2021-07-30 NOTE — ED PROVIDER NOTE - CARE PROVIDER_API CALL
Benny Odom)  Orthopaedic Surgery  [address] [place] [address], [address]  [place] [address]  Phone: [phone]  Fax: [phone]  Follow Up Time:

## 2021-07-30 NOTE — ED PROVIDER NOTE - PATIENT PORTAL LINK FT
You can access the FollowMyHealth Patient Portal offered by [facility] by registering at the following website: [email or web address]. By joining [facility]’s FollowMyHealth portal, you will also be able to view your health information using other applications (apps) compatible with our system.

## 2021-07-30 NOTE — ED PROVIDER NOTE - MUSCULOSKELETAL, MLM
L olecranon swelling and erythema; no restriction of range of motion; slight tenderness to supination

## 2021-07-30 NOTE — ED PROVIDER NOTE - OBJECTIVE STATEMENT
56 y/o M with PMHx of BPH, hyperlipidemia, Myocardial Infarction presents to ED with 3 day history of L elbow swelling and tenderness, worse this morning upon arising. Patient notes microtrauma while using his computer and resting his elbow on the desk. No other trauma. Denies fever and chills.

## 2021-08-23 ENCOUNTER — NON-APPOINTMENT (OUTPATIENT)
Age: 56
End: 2021-08-23

## 2021-09-23 ENCOUNTER — TRANSCRIPTION ENCOUNTER (OUTPATIENT)
Age: 56
End: 2021-09-23

## 2021-10-25 ENCOUNTER — INPATIENT (INPATIENT)
Facility: HOSPITAL | Age: 56
LOS: 0 days | Discharge: HOME | DRG: 149 | End: 2021-10-26
Attending: PSYCHIATRY & NEUROLOGY | Admitting: PSYCHIATRY & NEUROLOGY
Payer: COMMERCIAL

## 2021-10-25 VITALS
TEMPERATURE: 98 F | OXYGEN SATURATION: 98 % | WEIGHT: 160.06 LBS | DIASTOLIC BLOOD PRESSURE: 97 MMHG | SYSTOLIC BLOOD PRESSURE: 150 MMHG | HEART RATE: 79 BPM | RESPIRATION RATE: 18 BRPM | HEIGHT: 71 IN

## 2021-10-25 LAB
ALBUMIN SERPL ELPH-MCNC: 3.5 G/DL — SIGNIFICANT CHANGE UP (ref 3.4–5)
ALP SERPL-CCNC: 86 U/L — SIGNIFICANT CHANGE UP (ref 40–120)
ALT FLD-CCNC: 51 U/L — HIGH (ref 12–42)
ANION GAP SERPL CALC-SCNC: 6 MMOL/L — LOW (ref 9–16)
APPEARANCE UR: CLEAR — SIGNIFICANT CHANGE UP
AST SERPL-CCNC: 34 U/L — SIGNIFICANT CHANGE UP (ref 15–37)
BASOPHILS # BLD AUTO: 0.02 K/UL — SIGNIFICANT CHANGE UP (ref 0–0.2)
BASOPHILS NFR BLD AUTO: 0.4 % — SIGNIFICANT CHANGE UP (ref 0–2)
BILIRUB SERPL-MCNC: 0.5 MG/DL — SIGNIFICANT CHANGE UP (ref 0.2–1.2)
BILIRUB UR-MCNC: NEGATIVE — SIGNIFICANT CHANGE UP
BUN SERPL-MCNC: 16 MG/DL — SIGNIFICANT CHANGE UP (ref 7–23)
CALCIUM SERPL-MCNC: 9.3 MG/DL — SIGNIFICANT CHANGE UP (ref 8.5–10.5)
CHLORIDE SERPL-SCNC: 107 MMOL/L — SIGNIFICANT CHANGE UP (ref 96–108)
CK SERPL-CCNC: 136 U/L — SIGNIFICANT CHANGE UP (ref 39–308)
CO2 SERPL-SCNC: 28 MMOL/L — SIGNIFICANT CHANGE UP (ref 22–31)
COLOR SPEC: YELLOW — SIGNIFICANT CHANGE UP
CREAT SERPL-MCNC: 0.94 MG/DL — SIGNIFICANT CHANGE UP (ref 0.5–1.3)
DIFF PNL FLD: NEGATIVE — SIGNIFICANT CHANGE UP
EOSINOPHIL # BLD AUTO: 0.27 K/UL — SIGNIFICANT CHANGE UP (ref 0–0.5)
EOSINOPHIL NFR BLD AUTO: 5.5 % — SIGNIFICANT CHANGE UP (ref 0–6)
GLUCOSE SERPL-MCNC: 124 MG/DL — HIGH (ref 70–99)
GLUCOSE UR QL: NEGATIVE — SIGNIFICANT CHANGE UP
HCT VFR BLD CALC: 43.2 % — SIGNIFICANT CHANGE UP (ref 39–50)
HGB BLD-MCNC: 14.6 G/DL — SIGNIFICANT CHANGE UP (ref 13–17)
IMM GRANULOCYTES NFR BLD AUTO: 0.2 % — SIGNIFICANT CHANGE UP (ref 0–1.5)
KETONES UR-MCNC: NEGATIVE — SIGNIFICANT CHANGE UP
LACTATE SERPL-SCNC: 0.6 MMOL/L — SIGNIFICANT CHANGE UP (ref 0.4–2)
LEUKOCYTE ESTERASE UR-ACNC: NEGATIVE — SIGNIFICANT CHANGE UP
LYMPHOCYTES # BLD AUTO: 1.36 K/UL — SIGNIFICANT CHANGE UP (ref 1–3.3)
LYMPHOCYTES # BLD AUTO: 27.8 % — SIGNIFICANT CHANGE UP (ref 13–44)
MAGNESIUM SERPL-MCNC: 2 MG/DL — SIGNIFICANT CHANGE UP (ref 1.6–2.6)
MCHC RBC-ENTMCNC: 29.4 PG — SIGNIFICANT CHANGE UP (ref 27–34)
MCHC RBC-ENTMCNC: 33.8 GM/DL — SIGNIFICANT CHANGE UP (ref 32–36)
MCV RBC AUTO: 87.1 FL — SIGNIFICANT CHANGE UP (ref 80–100)
MONOCYTES # BLD AUTO: 0.45 K/UL — SIGNIFICANT CHANGE UP (ref 0–0.9)
MONOCYTES NFR BLD AUTO: 9.2 % — SIGNIFICANT CHANGE UP (ref 2–14)
NEUTROPHILS # BLD AUTO: 2.78 K/UL — SIGNIFICANT CHANGE UP (ref 1.8–7.4)
NEUTROPHILS NFR BLD AUTO: 56.9 % — SIGNIFICANT CHANGE UP (ref 43–77)
NITRITE UR-MCNC: NEGATIVE — SIGNIFICANT CHANGE UP
NRBC # BLD: 0 /100 WBCS — SIGNIFICANT CHANGE UP (ref 0–0)
NT-PROBNP SERPL-SCNC: 51 PG/ML — SIGNIFICANT CHANGE UP
PH UR: 8 — SIGNIFICANT CHANGE UP (ref 5–8)
PLATELET # BLD AUTO: 175 K/UL — SIGNIFICANT CHANGE UP (ref 150–400)
POTASSIUM SERPL-MCNC: 3.6 MMOL/L — SIGNIFICANT CHANGE UP (ref 3.5–5.3)
POTASSIUM SERPL-SCNC: 3.6 MMOL/L — SIGNIFICANT CHANGE UP (ref 3.5–5.3)
PROT SERPL-MCNC: 7 G/DL — SIGNIFICANT CHANGE UP (ref 6.4–8.2)
PROT UR-MCNC: NEGATIVE MG/DL — SIGNIFICANT CHANGE UP
RBC # BLD: 4.96 M/UL — SIGNIFICANT CHANGE UP (ref 4.2–5.8)
RBC # FLD: 12.5 % — SIGNIFICANT CHANGE UP (ref 10.3–14.5)
SARS-COV-2 RNA SPEC QL NAA+PROBE: SIGNIFICANT CHANGE UP
SODIUM SERPL-SCNC: 141 MMOL/L — SIGNIFICANT CHANGE UP (ref 132–145)
SP GR SPEC: 1.01 — SIGNIFICANT CHANGE UP (ref 1–1.03)
TROPONIN I, HIGH SENSITIVITY RESULT: 12.5 NG/L — SIGNIFICANT CHANGE UP
TROPONIN I, HIGH SENSITIVITY RESULT: 6.6 NG/L — SIGNIFICANT CHANGE UP
TROPONIN T SERPL-MCNC: 0.01 NG/ML — SIGNIFICANT CHANGE UP (ref 0–0.01)
UROBILINOGEN FLD QL: 0.2 E.U./DL — SIGNIFICANT CHANGE UP
WBC # BLD: 4.89 K/UL — SIGNIFICANT CHANGE UP (ref 3.8–10.5)
WBC # FLD AUTO: 4.89 K/UL — SIGNIFICANT CHANGE UP (ref 3.8–10.5)

## 2021-10-25 PROCEDURE — 71046 X-RAY EXAM CHEST 2 VIEWS: CPT | Mod: 26

## 2021-10-25 PROCEDURE — 93010 ELECTROCARDIOGRAM REPORT: CPT

## 2021-10-25 PROCEDURE — 70498 CT ANGIOGRAPHY NECK: CPT | Mod: 26

## 2021-10-25 PROCEDURE — 99291 CRITICAL CARE FIRST HOUR: CPT

## 2021-10-25 PROCEDURE — 70450 CT HEAD/BRAIN W/O DYE: CPT | Mod: 26,59

## 2021-10-25 PROCEDURE — 99223 1ST HOSP IP/OBS HIGH 75: CPT

## 2021-10-25 PROCEDURE — 70496 CT ANGIOGRAPHY HEAD: CPT | Mod: 26

## 2021-10-25 PROCEDURE — 0042T: CPT | Mod: 26

## 2021-10-25 PROCEDURE — 71045 X-RAY EXAM CHEST 1 VIEW: CPT | Mod: 26

## 2021-10-25 PROCEDURE — 70551 MRI BRAIN STEM W/O DYE: CPT | Mod: 26

## 2021-10-25 RX ORDER — CLOPIDOGREL BISULFATE 75 MG/1
75 TABLET, FILM COATED ORAL DAILY
Refills: 0 | Status: DISCONTINUED | OUTPATIENT
Start: 2021-10-26 | End: 2021-10-26

## 2021-10-25 RX ORDER — INFLUENZA VIRUS VACCINE 15; 15; 15; 15 UG/.5ML; UG/.5ML; UG/.5ML; UG/.5ML
0.5 SUSPENSION INTRAMUSCULAR ONCE
Refills: 0 | Status: COMPLETED | OUTPATIENT
Start: 2021-10-25 | End: 2021-10-25

## 2021-10-25 RX ORDER — CLOPIDOGREL BISULFATE 75 MG/1
300 TABLET, FILM COATED ORAL ONCE
Refills: 0 | Status: COMPLETED | OUTPATIENT
Start: 2021-10-25 | End: 2021-10-25

## 2021-10-25 RX ORDER — MECLIZINE HCL 12.5 MG
25 TABLET ORAL ONCE
Refills: 0 | Status: COMPLETED | OUTPATIENT
Start: 2021-10-25 | End: 2021-10-25

## 2021-10-25 RX ORDER — DIPHENHYDRAMINE HCL 50 MG
25 CAPSULE ORAL ONCE
Refills: 0 | Status: COMPLETED | OUTPATIENT
Start: 2021-10-25 | End: 2021-10-25

## 2021-10-25 RX ORDER — METOCLOPRAMIDE HCL 10 MG
10 TABLET ORAL ONCE
Refills: 0 | Status: COMPLETED | OUTPATIENT
Start: 2021-10-25 | End: 2021-10-25

## 2021-10-25 RX ORDER — ENOXAPARIN SODIUM 100 MG/ML
40 INJECTION SUBCUTANEOUS EVERY 24 HOURS
Refills: 0 | Status: DISCONTINUED | OUTPATIENT
Start: 2021-10-25 | End: 2021-10-26

## 2021-10-25 RX ORDER — ASPIRIN/CALCIUM CARB/MAGNESIUM 324 MG
81 TABLET ORAL DAILY
Refills: 0 | Status: DISCONTINUED | OUTPATIENT
Start: 2021-10-26 | End: 2021-10-26

## 2021-10-25 RX ORDER — ASPIRIN/CALCIUM CARB/MAGNESIUM 324 MG
81 TABLET ORAL DAILY
Refills: 0 | Status: DISCONTINUED | OUTPATIENT
Start: 2021-10-25 | End: 2021-10-25

## 2021-10-25 RX ORDER — TAMSULOSIN HYDROCHLORIDE 0.4 MG/1
0.4 CAPSULE ORAL AT BEDTIME
Refills: 0 | Status: DISCONTINUED | OUTPATIENT
Start: 2021-10-25 | End: 2021-10-26

## 2021-10-25 RX ORDER — MECLIZINE HCL 12.5 MG
12.5 TABLET ORAL EVERY 12 HOURS
Refills: 0 | Status: DISCONTINUED | OUTPATIENT
Start: 2021-10-25 | End: 2021-10-26

## 2021-10-25 RX ORDER — CLOPIDOGREL BISULFATE 75 MG/1
75 TABLET, FILM COATED ORAL DAILY
Refills: 0 | Status: DISCONTINUED | OUTPATIENT
Start: 2021-10-25 | End: 2021-10-25

## 2021-10-25 RX ORDER — ATORVASTATIN CALCIUM 80 MG/1
40 TABLET, FILM COATED ORAL AT BEDTIME
Refills: 0 | Status: DISCONTINUED | OUTPATIENT
Start: 2021-10-25 | End: 2021-10-26

## 2021-10-25 RX ORDER — SODIUM CHLORIDE 9 MG/ML
1000 INJECTION INTRAMUSCULAR; INTRAVENOUS; SUBCUTANEOUS ONCE
Refills: 0 | Status: COMPLETED | OUTPATIENT
Start: 2021-10-25 | End: 2021-10-25

## 2021-10-25 RX ADMIN — SODIUM CHLORIDE 1000 MILLILITER(S): 9 INJECTION INTRAMUSCULAR; INTRAVENOUS; SUBCUTANEOUS at 09:15

## 2021-10-25 RX ADMIN — Medication 25 MILLIGRAM(S): at 09:16

## 2021-10-25 RX ADMIN — Medication 10 MILLIGRAM(S): at 09:15

## 2021-10-25 RX ADMIN — CLOPIDOGREL BISULFATE 300 MILLIGRAM(S): 75 TABLET, FILM COATED ORAL at 15:38

## 2021-10-25 RX ADMIN — TAMSULOSIN HYDROCHLORIDE 0.4 MILLIGRAM(S): 0.4 CAPSULE ORAL at 21:52

## 2021-10-25 RX ADMIN — ATORVASTATIN CALCIUM 40 MILLIGRAM(S): 80 TABLET, FILM COATED ORAL at 21:52

## 2021-10-25 NOTE — H&P ADULT - ASSESSMENT
56y Male with PMHx of MI (2003), HLD, BPH presented to [facility] ED with 2 episodes of dizziness and palpitation. Stroke code was called, CTH: no acute pathology, CTA: no LVO, CTP:18 ml volume mismatch in rt temporoparietal region. TPA not given as NIH:0. Admitted to stroke service for further evaluation and management.    Neuro  #CVA workup  - continue aspirin 81mg and plavix 75mg daily, patient took 324 mg ASA at home prior to arrival to ED.  - continue atorvastatin 40mg daily  - q4hr stroke neuro checks and vitals  - obtain MRI Brain without contrast  - Stroke Code HCT Results: no acute pathology  - Stroke Code CTA Results: No LVO  - Stroke education    Cards  # H/O MI:  - permissive hypertension, Goal -180  - obtain TTE with bubble  - Continous telemonitoring.  - F/U Trop.    #HLD  - high dose statin as above in CVA  - F/U lipid profile      GI  #Nutrition/Fluids/Electrolytes   - replete K<4 and Mg <2  - Diet: Regular  - IVF: none    Infectious Disease  - Stroke Code CXR results: no acute cardiopulmonary process    Endocrine  - F/U HbA1c  -F/U TSH level    DVT Prophylaxis  - lovenox sq for DVT prophylaxis   Code status: full code  Dispo: Stroke telemetry    Discussed with Neurology Attending  Discussed with patient about plan and goals of care bedside

## 2021-10-25 NOTE — ED ADULT NURSE NOTE - OBJECTIVE STATEMENT
Patient presents to Ed c/o headache and dizziness starting  this morning.  Pt reports a his head feeling full . STROKE CODE CALLEDBY PROVIDER< SEE FLOW RECORD

## 2021-10-25 NOTE — ED ADULT NURSE REASSESSMENT NOTE - NS ED NURSE REASSESS COMMENT FT1
STOKE code protocol followed, telestroke  consult done with PA, SEE flow record. Pt medicated as ordered, educated pt on care plan and dx process. Pt remains NPO, on bedrest.  Will continue to monitor.

## 2021-10-25 NOTE — ED ADULT NURSE NOTE - NSICDXPASTMEDICALHX_GEN_ALL_CORE_FT
PAST MEDICAL HISTORY:  BPH (benign prostatic hyperplasia)     HLD (hyperlipidemia)     MI (myocardial infarction)

## 2021-10-25 NOTE — PATIENT PROFILE ADULT - DO YOU LACK THE NECESSARY SUPPORT TO HELP YOU COPE WITH LIFE CHALLENGES?
Post-Op Assessment Note      CV Status:  Stable    Mental Status:  Alert and awake    Hydration Status:  Euvolemic    PONV Controlled:  Controlled    Airway Patency:  Patent    Post Op Vitals Reviewed: Yes          Staff: CRNA           BP   133/61   Temp 98 7 °F (37 1 °C) (12/08/17 0431)    Pulse 95 (12/08/17 0431)   Resp 18 (12/08/17 0431)    SpO2 100 % (12/08/17 0431) no

## 2021-10-25 NOTE — ED PROVIDER NOTE - ATTENDING CONTRIBUTION TO CARE
57 y/o M with Hx of CAD, MI, and hyperlipidemia presenting with persistent dizziness which began this morning upon awakening.  On exam, no vital sign derangements.  Neurological exam is non focal.  Steady gait.  Reviewed with on call stroke neurologist and will admit for additional testing.  ED stroke workup unremarkable.  Patient/family amenable with admission.  Initial NIHSS is 0.

## 2021-10-25 NOTE — H&P ADULT - NSHPPHYSICALEXAM_GEN_ALL_CORE
General: No acute distress.  Neck: no palpable LN, no thyromegaly  Chest: clear to aus B/L  Heart: S1,S2, no murmur  Abdomen: soft,nontender, BS+  Ext: Peripheral pulses 2+, no edema  Neurological  Mental status: Awake, alert and oriented x3.  Recent and remote memory intact.  Naming, repetition and comprehension intact.  Attention/concentration intact.  No dysarthria, no aphasia.  Fund of knowledge appropriate.    Cranial nerves: II: Pupils equally round and reactive to light,  III,IV,VI:  visual fields full, no nystagmus, extraocular muscles intact,   V: V1 through V3 intact bilaterally and symmetric,  VII: face symmetric,   VIII: hearing intact to finger rub,  IX,XII:palate elevation symmetric, tongue was midline.  XI: shoulder shrug intact.   Motor:  MRC grading 5/5 b/l UE/LE.   strength 5/5.  Normal tone and bulk.  No abnormal movements.    Sensation: Intact to light touch, proprioception, and pinprick.   Coordination: No dysmetria on finger-to-nose and heel-to-shin.  No dysdiadokinesia.  Reflexes: 2+ in bilateral UE/LE, downgoing toes bilaterally.   Gait: Narrow and steady. No ataxia.  Romberg negative    NIH:0

## 2021-10-25 NOTE — H&P ADULT - HISTORY OF PRESENT ILLNESS
56y Male with PMHx of MI (2003), HLD, BPH presented to [facility] ED with 2 episodes of dizziness and palpitation. It happened this am when he was trying to get up from bed. He described the episode as sudden room spinning, each episode lasting few seconds. It was followed palpiation, chest tightness and some left hand weakness/numbness. He took 4 baby ASA and came to ED. He denies any headache, blurry vision or double vision, weakness, paresthesia. He never had similar episode in the past. His PMH is significant for MI in 2003, he had Cardiac cath done, but no stensts were placed. he was on DAPT for 1-2 years. He hasn't been on any blood thinner recently. He had another cath done this year July, when he was admitted for acute urinary retention and found to have an abnormal EKG.  In ED, stroke code was called, NIH:0, CTH: no acute patholgy, CTA: No LVO, CTP: 18 ml mismatch in right temporoparietal region. TPA not given due to NIH:0. patient is being admitted under stroke service for further evaluation and management.    PAST MEDICAL & SURGICAL HISTORY:  MI (myocardial infarction)    HLD (hyperlipidemia)    BPH (benign prostatic hyperplasia)        FAMILY HISTORY:  Grandfather had stroke at age of 89.      SOCIAL HISTORY:   Smoking status: Smoked for 2-3 years, quit in 1992  Drinking: Didn't drink in past 4 years  Drug Use: No drug use in past 4 years    T(C): 36.5 (10-25-21 @ 16:31), Max: 36.8 (10-25-21 @ 09:54)  HR: 87 (10-25-21 @ 15:36) (74 - 87)  BP: 128/82 (10-25-21 @ 15:36) (128/82 - 150/97)  RR: 16 (10-25-21 @ 15:36) (16 - 18)  SpO2: 98% (10-25-21 @ 15:36) (97% - 98%)    MEDICATION RECONCILIATION   MEDICATIONS  (STANDING):  atorvastatin 40 milliGRAM(s) Oral at bedtime  enoxaparin Injectable 40 milliGRAM(s) SubCutaneous every 24 hours  tamsulosin 0.4 milliGRAM(s) Oral at bedtime    MEDICATIONS  (PRN):            56y Male with PMHx of MI (2003), HLD, BPH presented to [facility] ED with 2 episodes of dizziness and palpitation. It happened this am when he was trying to get up from bed. He described the episode as sudden room spinning kaleidoscope vision, each episode lasting few seconds. It was followed palpiation, chest tightness and some left hand weakness/numbness. He took 4 baby ASA and came to ED. He denies any headache, blurry vision or double vision, weakness, paresthesia. He never had similar episode in the past. His PMH is significant for MI in 2003, he had Cardiac cath done, but no stensts were placed. he was on DAPT for 1-2 years. He hasn't been on any blood thinner recently. He had another cath done this year July, when he was admitted for acute urinary retention and found to have an abnormal EKG.  In ED, stroke code was called, NIH:0, CTH: no acute patholgy, CTA: No LVO, CTP: 18 ml mismatch in right temporoparietal region. TPA not given due to NIH:0. patient is being admitted under stroke service for further evaluation and management.    PAST MEDICAL & SURGICAL HISTORY:  MI (myocardial infarction)    HLD (hyperlipidemia)    BPH (benign prostatic hyperplasia)        FAMILY HISTORY:  Grandfather had stroke at age of 89.      SOCIAL HISTORY:   Smoking status: Smoked for 2-3 years, quit in 1992  Drinking: Didn't drink in past 4 years  Drug Use: No drug use in past 4 years    T(C): 36.5 (10-25-21 @ 16:31), Max: 36.8 (10-25-21 @ 09:54)  HR: 87 (10-25-21 @ 15:36) (74 - 87)  BP: 128/82 (10-25-21 @ 15:36) (128/82 - 150/97)  RR: 16 (10-25-21 @ 15:36) (16 - 18)  SpO2: 98% (10-25-21 @ 15:36) (97% - 98%)    MEDICATION RECONCILIATION   MEDICATIONS  (STANDING):  atorvastatin 40 milliGRAM(s) Oral at bedtime  enoxaparin Injectable 40 milliGRAM(s) SubCutaneous every 24 hours  tamsulosin 0.4 milliGRAM(s) Oral at bedtime    MEDICATIONS  (PRN):            56y Male with PMHx of MI (2003), HLD, BPH presented to [facility] ED with 2 episodes of dizziness and palpitation. It happened this am when he was trying to get up from bed. He described the episode as sudden room spinning kaleidoscope vision, each episode lasting few seconds. It was followed palpiation, chest tightness and some left hand weakness/numbness. He took 4 baby ASA and came to ED. He denies any headache, blurry vision or double vision, weakness, paresthesia. He never had similar episode in the past. His PMH is significant for MI in 2003, he had Cardiac cath done, but no stensts were placed. he was on DAPT for 1-2 years. He hasn't been on any blood thinner recently. He had another cath done this year July, when he was admitted for acute urinary retention and found to have an abnormal EKG.  In ED, stroke code was called, NIH:0, CTH: no acute patholgy, CTA: No LVO, CTP: 18 ml mismatch in right temporoparietal region. TPA not given due to NIH:0. patient is being admitted under stroke service for further evaluation and management.     PAST MEDICAL & SURGICAL HISTORY:  MI (myocardial infarction)    HLD (hyperlipidemia)    BPH (benign prostatic hyperplasia)        FAMILY HISTORY:  Grandfather had stroke at age of 89.      SOCIAL HISTORY:   Smoking status: Smoked for 2-3 years, quit in 1992  Drinking: Didn't drink in past 4 years  Drug Use: No drug use in past 4 years.    T(C): 36.5 (10-25-21 @ 16:31), Max: 36.8 (10-25-21 @ 09:54)  HR: 87 (10-25-21 @ 15:36) (74 - 87)  BP: 128/82 (10-25-21 @ 15:36) (128/82 - 150/97)  RR: 16 (10-25-21 @ 15:36) (16 - 18)  SpO2: 98% (10-25-21 @ 15:36) (97% - 98%)    MEDICATION RECONCILIATION   MEDICATIONS  (STANDING):  atorvastatin 40 milliGRAM(s) Oral at bedtime  enoxaparin Injectable 40 milliGRAM(s) SubCutaneous every 24 hours  tamsulosin 0.4 milliGRAM(s) Oral at bedtime    MEDICATIONS  (PRN):

## 2021-10-25 NOTE — ED ADULT NURSE REASSESSMENT NOTE - NS ED NURSE REASSESS COMMENT FT1
Pt remains dizzy, but not as severe as when he just arrived > denies any nausea, vomiting or chest pain at this time . Provider aware. Pt  ambulatory without assistance. Admission pending.

## 2021-10-25 NOTE — ED PROVIDER NOTE - CLINICAL SUMMARY MEDICAL DECISION MAKING FREE TEXT BOX
55 y/o M with Hx of CAD, MI, and hyperlipidemia presenting with persistent dizziness. On exam, Pt appears well and in no acute distress. Neurological exam is unremarkable. Stroke code protocol initiated. NIH score of zero. Pt is not a TPA candidate. Will continue work up and follow up with neurology afterwards.

## 2021-10-25 NOTE — ED PROVIDER NOTE - OBJECTIVE STATEMENT
57 y/o M with PMHx of CAD, MI, and hyperlipidemia presents to the ED for persistent dizziness upon waking up this morning. Pt states he was asymptomatic initially but sat up in his bed and began having dizziness described as room-spinning sensations. He also noted some L hand weakness [no longer present] and nausea. He continued to have persistent dizziness until arrival to the ED. Pt denies fevers, chills, CP, SOB, blurred vision, vomiting, or LOC. Stroke code initiated at 8:28 AM.

## 2021-10-25 NOTE — STROKE CODE NOTE - ASSESSMENT/PLAN
The patient is a 56-year-old right-handed gentleman with a history of BPH and hyperlipidemia presenting  as telestroke consutlation in  ED.    He states he felt totally normal this AM after awaking from sleep. However, upon turning to get out of bed this am, he developed sudden-onset severe room spinning/ vertigo and left upper extremity weakness associated with nausea, chest pressure but no dysarthria, diplopia, numbness, headache, or other neurologic symptoms.  Symptoms gradually improved over the course of several hours but he continued to have posterior head pressure and lightheadedness.  CT head was unremarkable.  CTA was negative.  CTP showed a few areas hypoperfusion in the right hemisphere of unclear significance.  NIHSS was 0 and he was able to ambulate unassisted, walk on his toes/heels, and in tandem without difficulty.  Thus, given nondisabling nature of his symptoms, TPA was deferred.  He was started on aspirin/Plavix.    Plan will be to transfer to [facility] for stroke eval. Will obtain MRI brain without contrast as well as TTE.  Stroke labs.  Continue aspirin, Plavix, statin therapy.  BP: Permissive hypertension.

## 2021-10-25 NOTE — PATIENT PROFILE ADULT - STATED REASON FOR ADMISSION
episode of severe dizziness this morning after rolling over in bed. Also had chest pressure, rapid heart beat, and slight numbness/weakness to L arm.

## 2021-10-25 NOTE — ED PROVIDER NOTE - CCCP TRG CHIEF CMPLNT
70 yo F with PMH of HTN, hypothyroidism, asthma, gastritis, arthritis, sciatica p/w  R hip/buttock pain radiating down right leg X 5 days. Denies injury, numbness/tingling, weakness. Similar sxs in the past. No swelling/ skin changes or temp changes, CP, SOB. Has not taken any meds for these sxs. Toradol and Flexeril given and with improvement of pain/ sxs. Rx given and To f/up outpt. dizziness

## 2021-10-25 NOTE — ED ADULT TRIAGE NOTE - CHIEF COMPLAINT QUOTE
Patient to ED BIBEMS for dizziness, chest pain, weakness beginning this morning.  EKG normal in field.  VS WNL.  No acute distress

## 2021-10-25 NOTE — H&P ADULT - NSHPLABSRESULTS_GEN_ALL_CORE
14.6                 141  | 28   | 16           4.89  >-----------< 175     ------------------------< 124                   43.2                 3.6  | 107  | 0.94                                         Ca 9.3   Mg 2.0   Ph x      < from: CT Brain Stroke Protocol (10.25.21 @ 08:33) >    No acute intracranial hemorrhage, transcortical infarct, or mass effect.    < end of copied text >    < from: CT Perfusion w/ Maps w/ IV Cont (10.25.21 @ 08:52) >     The CBF<30% volume is 0 mL.  The Tmax > 6s volume is 18 mL within the right parietotemporal region.  The mismatch volume is 18 mL.    IMPRESSION: Elevated Tmax within the right parietotemporal region of uncertain etiology. Otherwise, unremarkable examination.< from: CT Angio Neck w/ IV Cont (10.25.21 @ 09:10) >    No significant stenosis, occlusion or dissection of the cervical carotid or vertebral arteries.    1.1 cm right thyroid lobe nodule which can be further evaluated with dedicated outpatient thyroid ultrasound.    --- End of Report ---

## 2021-10-26 ENCOUNTER — TRANSCRIPTION ENCOUNTER (OUTPATIENT)
Age: 56
End: 2021-10-26

## 2021-10-26 VITALS
OXYGEN SATURATION: 98 % | RESPIRATION RATE: 18 BRPM | DIASTOLIC BLOOD PRESSURE: 59 MMHG | SYSTOLIC BLOOD PRESSURE: 104 MMHG | HEART RATE: 86 BPM

## 2021-10-26 LAB
A1C WITH ESTIMATED AVERAGE GLUCOSE RESULT: 5.3 % — SIGNIFICANT CHANGE UP (ref 4–5.6)
ANION GAP SERPL CALC-SCNC: 8 MMOL/L — SIGNIFICANT CHANGE UP (ref 5–17)
BUN SERPL-MCNC: 12 MG/DL — SIGNIFICANT CHANGE UP (ref 7–23)
CALCIUM SERPL-MCNC: 9 MG/DL — SIGNIFICANT CHANGE UP (ref 8.4–10.5)
CHLORIDE SERPL-SCNC: 107 MMOL/L — SIGNIFICANT CHANGE UP (ref 96–108)
CHOLEST SERPL-MCNC: 164 MG/DL — SIGNIFICANT CHANGE UP
CO2 SERPL-SCNC: 28 MMOL/L — SIGNIFICANT CHANGE UP (ref 22–31)
COVID-19 SPIKE DOMAIN AB INTERP: POSITIVE
COVID-19 SPIKE DOMAIN ANTIBODY RESULT: >250 U/ML — HIGH
CREAT SERPL-MCNC: 0.98 MG/DL — SIGNIFICANT CHANGE UP (ref 0.5–1.3)
ESTIMATED AVERAGE GLUCOSE: 105 MG/DL — SIGNIFICANT CHANGE UP (ref 68–114)
GLUCOSE SERPL-MCNC: 109 MG/DL — HIGH (ref 70–99)
HCT VFR BLD CALC: 44.5 % — SIGNIFICANT CHANGE UP (ref 39–50)
HCV AB S/CO SERPL IA: 0.05 S/CO — SIGNIFICANT CHANGE UP
HCV AB SERPL-IMP: SIGNIFICANT CHANGE UP
HDLC SERPL-MCNC: 53 MG/DL — SIGNIFICANT CHANGE UP
HGB BLD-MCNC: 14.6 G/DL — SIGNIFICANT CHANGE UP (ref 13–17)
LIPID PNL WITH DIRECT LDL SERPL: 96 MG/DL — SIGNIFICANT CHANGE UP
MAGNESIUM SERPL-MCNC: 2.2 MG/DL — SIGNIFICANT CHANGE UP (ref 1.6–2.6)
MCHC RBC-ENTMCNC: 29 PG — SIGNIFICANT CHANGE UP (ref 27–34)
MCHC RBC-ENTMCNC: 32.8 GM/DL — SIGNIFICANT CHANGE UP (ref 32–36)
MCV RBC AUTO: 88.3 FL — SIGNIFICANT CHANGE UP (ref 80–100)
NON HDL CHOLESTEROL: 111 MG/DL — SIGNIFICANT CHANGE UP
NRBC # BLD: 0 /100 WBCS — SIGNIFICANT CHANGE UP (ref 0–0)
PHOSPHATE SERPL-MCNC: 2.9 MG/DL — SIGNIFICANT CHANGE UP (ref 2.5–4.5)
PLATELET # BLD AUTO: 195 K/UL — SIGNIFICANT CHANGE UP (ref 150–400)
POTASSIUM SERPL-MCNC: 3.6 MMOL/L — SIGNIFICANT CHANGE UP (ref 3.5–5.3)
POTASSIUM SERPL-SCNC: 3.6 MMOL/L — SIGNIFICANT CHANGE UP (ref 3.5–5.3)
RBC # BLD: 5.04 M/UL — SIGNIFICANT CHANGE UP (ref 4.2–5.8)
RBC # FLD: 13 % — SIGNIFICANT CHANGE UP (ref 10.3–14.5)
SARS-COV-2 IGG+IGM SERPL QL IA: >250 U/ML — HIGH
SARS-COV-2 IGG+IGM SERPL QL IA: POSITIVE
SODIUM SERPL-SCNC: 143 MMOL/L — SIGNIFICANT CHANGE UP (ref 135–145)
TRIGL SERPL-MCNC: 75 MG/DL — SIGNIFICANT CHANGE UP
TROPONIN T SERPL-MCNC: 0.01 NG/ML — SIGNIFICANT CHANGE UP (ref 0–0.01)
TSH SERPL-MCNC: 1.93 UIU/ML — SIGNIFICANT CHANGE UP (ref 0.27–4.2)
WBC # BLD: 4.77 K/UL — SIGNIFICANT CHANGE UP (ref 3.8–10.5)
WBC # FLD AUTO: 4.77 K/UL — SIGNIFICANT CHANGE UP (ref 3.8–10.5)

## 2021-10-26 PROCEDURE — 82962 GLUCOSE BLOOD TEST: CPT

## 2021-10-26 PROCEDURE — 83036 HEMOGLOBIN GLYCOSYLATED A1C: CPT

## 2021-10-26 PROCEDURE — 97161 PT EVAL LOW COMPLEX 20 MIN: CPT

## 2021-10-26 PROCEDURE — 83880 ASSAY OF NATRIURETIC PEPTIDE: CPT

## 2021-10-26 PROCEDURE — 97162 PT EVAL MOD COMPLEX 30 MIN: CPT

## 2021-10-26 PROCEDURE — 83735 ASSAY OF MAGNESIUM: CPT

## 2021-10-26 PROCEDURE — 87635 SARS-COV-2 COVID-19 AMP PRB: CPT

## 2021-10-26 PROCEDURE — 83605 ASSAY OF LACTIC ACID: CPT

## 2021-10-26 PROCEDURE — 70551 MRI BRAIN STEM W/O DYE: CPT

## 2021-10-26 PROCEDURE — 96375 TX/PRO/DX INJ NEW DRUG ADDON: CPT

## 2021-10-26 PROCEDURE — 93005 ELECTROCARDIOGRAM TRACING: CPT

## 2021-10-26 PROCEDURE — 99239 HOSP IP/OBS DSCHRG MGMT >30: CPT

## 2021-10-26 PROCEDURE — 70450 CT HEAD/BRAIN W/O DYE: CPT

## 2021-10-26 PROCEDURE — 86769 SARS-COV-2 COVID-19 ANTIBODY: CPT

## 2021-10-26 PROCEDURE — 81003 URINALYSIS AUTO W/O SCOPE: CPT

## 2021-10-26 PROCEDURE — 84443 ASSAY THYROID STIM HORMONE: CPT

## 2021-10-26 PROCEDURE — 84100 ASSAY OF PHOSPHORUS: CPT

## 2021-10-26 PROCEDURE — 80053 COMPREHEN METABOLIC PANEL: CPT

## 2021-10-26 PROCEDURE — 0042T: CPT

## 2021-10-26 PROCEDURE — 80061 LIPID PANEL: CPT

## 2021-10-26 PROCEDURE — 36415 COLL VENOUS BLD VENIPUNCTURE: CPT

## 2021-10-26 PROCEDURE — 85027 COMPLETE CBC AUTOMATED: CPT

## 2021-10-26 PROCEDURE — 84484 ASSAY OF TROPONIN QUANT: CPT

## 2021-10-26 PROCEDURE — 99285 EMERGENCY DEPT VISIT HI MDM: CPT | Mod: 25

## 2021-10-26 PROCEDURE — 70496 CT ANGIOGRAPHY HEAD: CPT

## 2021-10-26 PROCEDURE — 80048 BASIC METABOLIC PNL TOTAL CA: CPT

## 2021-10-26 PROCEDURE — 85025 COMPLETE CBC W/AUTO DIFF WBC: CPT

## 2021-10-26 PROCEDURE — 86803 HEPATITIS C AB TEST: CPT

## 2021-10-26 PROCEDURE — 71045 X-RAY EXAM CHEST 1 VIEW: CPT

## 2021-10-26 PROCEDURE — 82550 ASSAY OF CK (CPK): CPT

## 2021-10-26 PROCEDURE — 70498 CT ANGIOGRAPHY NECK: CPT

## 2021-10-26 PROCEDURE — 71046 X-RAY EXAM CHEST 2 VIEWS: CPT

## 2021-10-26 PROCEDURE — 96374 THER/PROPH/DIAG INJ IV PUSH: CPT

## 2021-10-26 RX ORDER — ASPIRIN/CALCIUM CARB/MAGNESIUM 324 MG
1 TABLET ORAL
Qty: 30 | Refills: 0
Start: 2021-10-26 | End: 2021-11-24

## 2021-10-26 RX ORDER — TAMSULOSIN HYDROCHLORIDE 0.4 MG/1
1 CAPSULE ORAL
Qty: 0 | Refills: 0 | DISCHARGE
Start: 2021-10-26

## 2021-10-26 RX ORDER — ASPIRIN/CALCIUM CARB/MAGNESIUM 324 MG
1 TABLET ORAL
Qty: 30 | Refills: 3
Start: 2021-10-26 | End: 2022-02-22

## 2021-10-26 RX ORDER — FINASTERIDE 5 MG/1
1 TABLET, FILM COATED ORAL
Qty: 0 | Refills: 0 | DISCHARGE
Start: 2021-10-26

## 2021-10-26 RX ORDER — FINASTERIDE 5 MG/1
5 TABLET, FILM COATED ORAL DAILY
Refills: 0 | Status: DISCONTINUED | OUTPATIENT
Start: 2021-10-26 | End: 2021-10-26

## 2021-10-26 RX ORDER — MECLIZINE HCL 12.5 MG
1 TABLET ORAL
Qty: 30 | Refills: 0
Start: 2021-10-26 | End: 2021-11-09

## 2021-10-26 RX ORDER — ATORVASTATIN CALCIUM 80 MG/1
1 TABLET, FILM COATED ORAL
Qty: 0 | Refills: 0 | DISCHARGE

## 2021-10-26 RX ORDER — TAMSULOSIN HYDROCHLORIDE 0.4 MG/1
1 CAPSULE ORAL
Qty: 0 | Refills: 0 | DISCHARGE

## 2021-10-26 RX ORDER — ATORVASTATIN CALCIUM 80 MG/1
1 TABLET, FILM COATED ORAL
Qty: 0 | Refills: 0 | DISCHARGE
Start: 2021-10-26

## 2021-10-26 RX ORDER — MECLIZINE HCL 12.5 MG
1 TABLET ORAL
Qty: 8 | Refills: 0
Start: 2021-10-26 | End: 2021-10-29

## 2021-10-26 NOTE — DISCHARGE NOTE NURSING/CASE MANAGEMENT/SOCIAL WORK - NSDCFUADDAPPT_GEN_ALL_CORE_FT
Dr. Mehran Granados   Cardiologist, [facility]  [phone]  ON 10/27/2021 at 1:20 PM

## 2021-10-26 NOTE — PHYSICAL THERAPY INITIAL EVALUATION ADULT - ACTIVE RANGE OF MOTION EXAMINATION, REHAB EVAL
chelsea. upper extremity Active ROM was WNL (within normal limits)/bilateral lower extremity Active ROM was WNL (within normal limits)

## 2021-10-26 NOTE — DISCHARGE NOTE PROVIDER - NSDCFUADDAPPT_GEN_ALL_CORE_FT
Dr. Mehran Granados   Cardiologist, [facility]  [phone]  ON 10/27/2021 at 1:20 PM

## 2021-10-26 NOTE — OCCUPATIONAL THERAPY INITIAL EVALUATION ADULT - ADDITIONAL COMMENTS
Pt reports living with his wife in an elevator apt building. Denies stair use, DME or AD. Pt reports prior to hospitalization being independent with all ADL and IADLs.

## 2021-10-26 NOTE — PHYSICAL THERAPY INITIAL EVALUATION ADULT - ADDITIONAL COMMENTS
Pt reports he lives with his wife and reports he was independent with all ADLs prior to admission. Denies use of DME. Pt reports walking to work as mode of transportation. No falls reported.

## 2021-10-26 NOTE — PHYSICAL THERAPY INITIAL EVALUATION ADULT - GENERAL OBSERVATIONS, REHAB EVAL
PT IE Completed. Pt received sitting in bedside chair, on RA, NAD, +telemonitoring. RN Comfort notified, pt agreeable to PT. Patient is safe and independent with all bed mobility, functional transfers, ambulation, and stair negotiation. Pt assessed for BPPV; neg kanwal hallpike bilaterally, absent nystagmus, no dizziness reported with positional changes/head turns.Patient is discharged from PT program at this time.

## 2021-10-26 NOTE — DISCHARGE NOTE PROVIDER - HOSPITAL COURSE
Hospital course:  56y Male with PMH of MI (2003), HLD, BPH presented to [facility] ED with 2 episodes of dizziness and palpitation. It happened this am when he was trying to get up from bed. He described the episode as sudden room spinning kaleidoscope vision, each episode lasting few seconds. It was followed palpitation, chest tightness and some left hand weakness/numbness. He took 4 baby ASA and came to ED. He denies any headache, blurry vision or double vision, weakness, paresthesia. He never had similar episode in the past. His PMH is significant for MI in 2003, he had Cardiac cath done, but no stensts were placed. he was on DAPT for 1-2 years. He hasn't been on any blood thinner recently. He had another cath done this year July, when he was admitted for acute urinary retention and found to have an abnormal EKG.  In ED, stroke code was called, NIH:0, CTH: no acute patholgy, CTA: No LVO, CTP: 18 ml mismatch in right temporoparietal region. TPA not given due to NIH:0. patient was  admitted under stroke service for further evaluation and management. Patient's symptoms improved after receiving meclizine. His MRI ruled out acute stroke. His telemetry monitoring didn't reveal any arrythmia.   Patient had the following workup done in house:  CT Head: No acute pathology  MR Head Non Contrast: No acute infarct  CT Angio Head: No large vessel occlusion  CT Angio Neck: No large vessel occlusion  [] Echo : patient would like to do it as outpatient  [x] Labs  [] Other    Physical exam at discharge: NIHSS:0, no deficit noted    New medications on discharge: Aspirin 81 mg, Meclizine 12.5 mg  Labs to be followed up: TSH  Imaging to be done as outpatient: None  Further outpatient workup: TTE, rest As per patients' cardiologist     Hospital course:  The patient is a 56y Male with PMH of MI (2003, but with negative subsequent cardiac caths), HLD, and BPH who presented to [facility] ED with 2 episodes of vertigo.  Symptoms occurred suddenly after turning in bed and lasted only a few seconds. He describes sudden room spinning. It was followed palpitations, chest tightness, and generalized weakness (patient initially reported left arm weakness/heaviness but later clarified it was more generalized weakness).  He took 4 baby ASA and came to [facility]. He had no associated headache, blurry vision or double vision, unilateral weakness, paresthesia. He never had a similar episode in the past.     In ED, stroke code was called, NIH:0, CTH: no acute patholgy, CTA: No LVO, CTP: 18 ml mismatch in right temporoparietal region of questionable etiology. TPA not given due to NIHSS of 0. Given possible perfusion deficits that potentially correlated to patient's symptoms (though later were clarified to be a generalized weakness and not unilateral), he was  admitted under stroke service for stroke rule out. Patient's symptoms improved after receiving meclizine in the [facility] though he continued to have more mild transient episodes with head turning during this admission. His MRI ruled out acute stroke. His telemetry monitoring didn't reveal any arrythmia. TTE was recommended but patient deferred to the outpatient setting.     Overall, etiology of patient's episode was felt most likely peripheral vertigo (BPPV) rather than TIA/stroke or cardiac cause (EKG, telemetry, troponin unremarkable). If symptoms recur, we discuss referral to vestibular therapy and possibly ENT as well.      Patient had the following workup done in house:  CT Head: No acute pathology  MR Head Non Contrast: No acute infarct  CT Angio Head: No large vessel occlusion  CT Angio Neck: No large vessel occlusion  [] Echo : patient would like to do it as outpatient  [x] Labs  [] Other    Physical exam at discharge: NIHSS:0, no deficit noted    New medications on discharge: Aspirin 81 mg, Meclizine 12.5 mg  Labs to be followed up: TSH  Imaging to be done as outpatient: None  Further outpatient workup: TTE, possible Ziopatch rest As per patients' cardiologist

## 2021-10-26 NOTE — OCCUPATIONAL THERAPY INITIAL EVALUATION ADULT - PERTINENT HX OF CURRENT PROBLEM, REHAB EVAL
56y Male with PMHx of MI (2003), HLD, BPH presented to [facility] ED with 2 episodes of dizziness and palpitation. Stroke code was called, CTH: no acute pathology, CTA: no LVO, CTP:18 ml volume mismatch in rt temporoparietal region. TPA not given as NIH:0. Admitted to stroke service for further evaluation and management.

## 2021-10-26 NOTE — DISCHARGE NOTE PROVIDER - CARE PROVIDER_API CALL
Manuela Avalos)  Neurology  [address] [address]  Phone: [phone]  Fax: [phone]  Follow Up Time: 2 weeks

## 2021-10-26 NOTE — DISCHARGE NOTE PROVIDER - NSDCMRMEDTOKEN_GEN_ALL_CORE_FT
Aspirin Low Dose 81 mg oral tablet, chewable: 1 tab(s) orally once a day  atorvastatin 40 mg oral tablet: 1 tab(s) orally once a day (at bedtime)  finasteride 5 mg oral tablet: 1 tab(s) orally once a day  meclizine 12.5 mg oral tablet: 1 tab(s) orally every 12 hours, As Needed - for dizziness   tamsulosin 0.4 mg oral capsule: 1 cap(s) orally once a day (at bedtime)

## 2021-10-26 NOTE — PHYSICAL THERAPY INITIAL EVALUATION ADULT - MODALITIES TREATMENT COMMENTS
Cranial Nerves II - XII: II: PERRLA; visual fields are full to confrontation III, IV, VI: EOMI, no nystagmus appreciated V: facial sensation intact to light touch V1-V3 b/l VII: no ptosis, no facial droop, symmetric eyebrow raise and closure VIII: reports diminished L hearing XI: head turning and shoulder shrug intact b/l XII: tongue protrusion midline. Neg [place] bilaterally. Cranial Nerves II - XII: II: PERRLA; visual fields are full to confrontation III, IV, VI: EOMI, no nystagmus appreciated V: facial sensation intact to light touch V1-V3 b/l VII: no ptosis, no facial droop, symmetric eyebrow raise and closure VIII: reports diminished L hearing XI: head turning and shoulder shrug intact b/l XII: tongue protrusion midline. Neg [place] bilaterally. Pt reported he has chronic neck pain and educated to follow up with his PT and ENT if dizziness returns.

## 2021-10-26 NOTE — OCCUPATIONAL THERAPY INITIAL EVALUATION ADULT - DIAGNOSIS, OT EVAL
Pt presents with no OT deficits at this time as OT evaluation demonstrated independence with all ADL, functional mobility and transfers. Pt to be discharged from OT program at this time.

## 2021-10-26 NOTE — PHYSICAL THERAPY INITIAL EVALUATION ADULT - PERTINENT HX OF CURRENT PROBLEM, REHAB EVAL
56y Male with PMHx of MI (2003), HLD, BPH presented to [facility] ED with 2 episodes of dizziness and palpitation. Stroke code was called, CTH: no acute pathology, CTA: no LVO, CTP:18 ml volume mismatch in rt temporoparietal region. TPA not given as NIH:0. Admitted to stroke service for further evaluation and management.

## 2021-10-26 NOTE — ED PROVIDER NOTE - CROS ED RESP ALL NEG
negative... Island Pedicle Flap Text: The defect edges were debeveled with a #15 scalpel blade.  Given the location of the defect, shape of the defect and the proximity to free margins an island pedicle advancement flap was deemed most appropriate.  Using a sterile surgical marker, an appropriate advancement flap was drawn incorporating the defect, outlining the appropriate donor tissue and placing the expected incisions within the relaxed skin tension lines where possible.    The area thus outlined was incised deep to adipose tissue with a #15 scalpel blade.  The skin margins were undermined to an appropriate distance in all directions around the primary defect and laterally outward around the island pedicle utilizing iris scissors.  There was minimal undermining beneath the pedicle flap.

## 2021-10-26 NOTE — DISCHARGE NOTE PROVIDER - NSDCCPCAREPLAN_GEN_ALL_CORE_FT
PRINCIPAL DISCHARGE DIAGNOSIS  Diagnosis: Dizziness  Assessment and Plan of Treatment: You were admitted in hospital for dizziness, your MRI showed no stroke. When you're dizzy, you may feel lightheaded, woozy, or disoriented. If you feel like you or the room are spinning, you have vertigo. These feelings may make you lose your balance.  Dizziness can have many different causes. A sudden drop in blood pressure or being dehydrated can make you dizzy. Many people feel lightheaded if they get up too quickly from sitting or lying down. Certain medicines and problems with your inner ear may cause dizziness. So can motion sickness. Sometimes dizziness can be a symptom of other disorders. Please follow up with your cardiologist for rest of the workup to be done

## 2021-10-26 NOTE — DISCHARGE NOTE NURSING/CASE MANAGEMENT/SOCIAL WORK - PATIENT PORTAL LINK FT
You can access the FollowMyHealth Patient Portal offered by [facility] by registering at the following website: [email or web address]. By joining [facility]’s FollowMyHealth portal, you will also be able to view your health information using other applications (apps) compatible with our system.

## 2021-10-26 NOTE — OCCUPATIONAL THERAPY INITIAL EVALUATION ADULT - THERAPY FREQUENCY, OT EVAL
Pt is independent with all ADL and IADl, functional mobility and transfers. Pt to be discharged from OT program. No skilled OT needs at this time

## 2021-10-26 NOTE — DISCHARGE NOTE PROVIDER - NSFOLLOWUPCLINICS_GEN_ALL_ED_FT
[facility]  Otolaryngology (ENT)  [address] [place] [address] - 10[address]  [place], [place] [id number]  Phone: [phone]  Fax:   Follow Up Time: 2 weeks

## 2021-11-02 DIAGNOSIS — Z82.3 FAMILY HISTORY OF STROKE: ICD-10-CM

## 2021-11-02 DIAGNOSIS — E78.5 HYPERLIPIDEMIA, UNSPECIFIED: ICD-10-CM

## 2021-11-02 DIAGNOSIS — Z79.52 LONG TERM (CURRENT) USE OF SYSTEMIC STEROIDS: ICD-10-CM

## 2021-11-02 DIAGNOSIS — N40.0 BENIGN PROSTATIC HYPERPLASIA WITHOUT LOWER URINARY TRACT SYMPTOMS: ICD-10-CM

## 2021-11-02 DIAGNOSIS — R53.1 WEAKNESS: ICD-10-CM

## 2021-11-02 DIAGNOSIS — R42 DIZZINESS AND GIDDINESS: ICD-10-CM

## 2021-11-02 DIAGNOSIS — R29.700 NIHSS SCORE 0: ICD-10-CM

## 2021-11-02 DIAGNOSIS — I25.10 ATHEROSCLEROTIC HEART DISEASE OF NATIVE CORONARY ARTERY WITHOUT ANGINA PECTORIS: ICD-10-CM

## 2021-11-02 DIAGNOSIS — Z87.891 PERSONAL HISTORY OF NICOTINE DEPENDENCE: ICD-10-CM

## 2021-11-02 DIAGNOSIS — H81.10 BENIGN PAROXYSMAL VERTIGO, UNSPECIFIED EAR: ICD-10-CM

## 2021-11-02 DIAGNOSIS — I25.2 OLD MYOCARDIAL INFARCTION: ICD-10-CM

## 2021-11-02 DIAGNOSIS — Z79.899 OTHER LONG TERM (CURRENT) DRUG THERAPY: ICD-10-CM

## 2021-11-20 ENCOUNTER — TRANSCRIPTION ENCOUNTER (OUTPATIENT)
Age: 56
End: 2021-11-20

## 2022-02-10 ENCOUNTER — APPOINTMENT (OUTPATIENT)
Dept: UROLOGY | Facility: CLINIC | Age: 57
End: 2022-02-10

## 2022-02-15 ENCOUNTER — APPOINTMENT (OUTPATIENT)
Dept: NEUROLOGY | Facility: CLINIC | Age: 57
End: 2022-02-15

## 2022-03-25 ENCOUNTER — TRANSCRIPTION ENCOUNTER (OUTPATIENT)
Age: 57
End: 2022-03-25

## 2022-04-12 ENCOUNTER — NON-APPOINTMENT (OUTPATIENT)
Age: 57
End: 2022-04-12

## 2022-04-14 ENCOUNTER — APPOINTMENT (OUTPATIENT)
Dept: UROLOGY | Facility: CLINIC | Age: 57
End: 2022-04-14

## 2022-05-19 ENCOUNTER — APPOINTMENT (OUTPATIENT)
Dept: UROLOGY | Facility: CLINIC | Age: 57
End: 2022-05-19

## 2022-06-10 ENCOUNTER — NON-APPOINTMENT (OUTPATIENT)
Age: 57
End: 2022-06-10

## 2022-06-11 ENCOUNTER — EMERGENCY (EMERGENCY)
Facility: HOSPITAL | Age: 57
LOS: 1 days | Discharge: HOME | End: 2022-06-11
Admitting: EMERGENCY MEDICINE
Payer: COMMERCIAL

## 2022-06-11 VITALS
TEMPERATURE: 98 F | DIASTOLIC BLOOD PRESSURE: 90 MMHG | RESPIRATION RATE: 17 BRPM | SYSTOLIC BLOOD PRESSURE: 149 MMHG | HEART RATE: 79 BPM | OXYGEN SATURATION: 96 %

## 2022-06-11 VITALS
HEIGHT: 71 IN | DIASTOLIC BLOOD PRESSURE: 77 MMHG | WEIGHT: 175.93 LBS | RESPIRATION RATE: 16 BRPM | TEMPERATURE: 98 F | OXYGEN SATURATION: 98 % | HEART RATE: 81 BPM | SYSTOLIC BLOOD PRESSURE: 123 MMHG

## 2022-06-11 LAB
ALBUMIN SERPL ELPH-MCNC: 3.9 G/DL — SIGNIFICANT CHANGE UP (ref 3.4–5)
ALP SERPL-CCNC: 85 U/L — SIGNIFICANT CHANGE UP (ref 40–120)
ALT FLD-CCNC: 56 U/L — HIGH (ref 12–42)
ANION GAP SERPL CALC-SCNC: 6 MMOL/L — LOW (ref 9–16)
APPEARANCE UR: CLEAR — SIGNIFICANT CHANGE UP
APTT BLD: 33.4 SEC — SIGNIFICANT CHANGE UP (ref 27.5–35.5)
AST SERPL-CCNC: 25 U/L — SIGNIFICANT CHANGE UP (ref 15–37)
BACTERIA # UR AUTO: PRESENT /HPF
BASOPHILS # BLD AUTO: 0.02 K/UL — SIGNIFICANT CHANGE UP (ref 0–0.2)
BASOPHILS NFR BLD AUTO: 0.2 % — SIGNIFICANT CHANGE UP (ref 0–2)
BILIRUB SERPL-MCNC: 0.7 MG/DL — SIGNIFICANT CHANGE UP (ref 0.2–1.2)
BILIRUB UR-MCNC: NEGATIVE — SIGNIFICANT CHANGE UP
BUN SERPL-MCNC: 19 MG/DL — SIGNIFICANT CHANGE UP (ref 7–23)
CALCIUM SERPL-MCNC: 9.5 MG/DL — SIGNIFICANT CHANGE UP (ref 8.5–10.5)
CHLORIDE SERPL-SCNC: 104 MMOL/L — SIGNIFICANT CHANGE UP (ref 96–108)
CO2 SERPL-SCNC: 32 MMOL/L — HIGH (ref 22–31)
COLOR SPEC: YELLOW — SIGNIFICANT CHANGE UP
COMMENT - URINE: SIGNIFICANT CHANGE UP
CREAT SERPL-MCNC: 0.83 MG/DL — SIGNIFICANT CHANGE UP (ref 0.5–1.3)
DIFF PNL FLD: ABNORMAL
EGFR: 101 ML/MIN/1.73M2 — SIGNIFICANT CHANGE UP
EOSINOPHIL # BLD AUTO: 0.17 K/UL — SIGNIFICANT CHANGE UP (ref 0–0.5)
EOSINOPHIL NFR BLD AUTO: 1.8 % — SIGNIFICANT CHANGE UP (ref 0–6)
EPI CELLS # UR: SIGNIFICANT CHANGE UP /HPF (ref 0–5)
GLUCOSE SERPL-MCNC: 96 MG/DL — SIGNIFICANT CHANGE UP (ref 70–99)
GLUCOSE UR QL: NEGATIVE — SIGNIFICANT CHANGE UP
HCT VFR BLD CALC: 45.8 % — SIGNIFICANT CHANGE UP (ref 39–50)
HGB BLD-MCNC: 15.1 G/DL — SIGNIFICANT CHANGE UP (ref 13–17)
IMM GRANULOCYTES NFR BLD AUTO: 0.3 % — SIGNIFICANT CHANGE UP (ref 0–1.5)
INR BLD: 1.08 — SIGNIFICANT CHANGE UP (ref 0.88–1.16)
KETONES UR-MCNC: NEGATIVE — SIGNIFICANT CHANGE UP
LEUKOCYTE ESTERASE UR-ACNC: NEGATIVE — SIGNIFICANT CHANGE UP
LIDOCAIN IGE QN: 107 U/L — SIGNIFICANT CHANGE UP (ref 73–393)
LYMPHOCYTES # BLD AUTO: 1.44 K/UL — SIGNIFICANT CHANGE UP (ref 1–3.3)
LYMPHOCYTES # BLD AUTO: 15.4 % — SIGNIFICANT CHANGE UP (ref 13–44)
MAGNESIUM SERPL-MCNC: 2.3 MG/DL — SIGNIFICANT CHANGE UP (ref 1.6–2.6)
MCHC RBC-ENTMCNC: 29.8 PG — SIGNIFICANT CHANGE UP (ref 27–34)
MCHC RBC-ENTMCNC: 33 GM/DL — SIGNIFICANT CHANGE UP (ref 32–36)
MCV RBC AUTO: 90.3 FL — SIGNIFICANT CHANGE UP (ref 80–100)
MONOCYTES # BLD AUTO: 0.82 K/UL — SIGNIFICANT CHANGE UP (ref 0–0.9)
MONOCYTES NFR BLD AUTO: 8.7 % — SIGNIFICANT CHANGE UP (ref 2–14)
NEUTROPHILS # BLD AUTO: 6.9 K/UL — SIGNIFICANT CHANGE UP (ref 1.8–7.4)
NEUTROPHILS NFR BLD AUTO: 73.6 % — SIGNIFICANT CHANGE UP (ref 43–77)
NITRITE UR-MCNC: NEGATIVE — SIGNIFICANT CHANGE UP
NRBC # BLD: 0 /100 WBCS — SIGNIFICANT CHANGE UP (ref 0–0)
PH UR: 6 — SIGNIFICANT CHANGE UP (ref 5–8)
PLATELET # BLD AUTO: 196 K/UL — SIGNIFICANT CHANGE UP (ref 150–400)
POTASSIUM SERPL-MCNC: 3.7 MMOL/L — SIGNIFICANT CHANGE UP (ref 3.5–5.3)
POTASSIUM SERPL-SCNC: 3.7 MMOL/L — SIGNIFICANT CHANGE UP (ref 3.5–5.3)
PROT SERPL-MCNC: 8.2 G/DL — SIGNIFICANT CHANGE UP (ref 6.4–8.2)
PROT UR-MCNC: NEGATIVE MG/DL — SIGNIFICANT CHANGE UP
PROTHROM AB SERPL-ACNC: 12.5 SEC — SIGNIFICANT CHANGE UP (ref 10.5–13.4)
RBC # BLD: 5.07 M/UL — SIGNIFICANT CHANGE UP (ref 4.2–5.8)
RBC # FLD: 12.1 % — SIGNIFICANT CHANGE UP (ref 10.3–14.5)
RBC CASTS # UR COMP ASSIST: ABNORMAL /HPF
SARS-COV-2 RNA SPEC QL NAA+PROBE: SIGNIFICANT CHANGE UP
SODIUM SERPL-SCNC: 142 MMOL/L — SIGNIFICANT CHANGE UP (ref 132–145)
SP GR SPEC: 1.02 — SIGNIFICANT CHANGE UP (ref 1–1.03)
UROBILINOGEN FLD QL: 0.2 E.U./DL — SIGNIFICANT CHANGE UP
WBC # BLD: 9.38 K/UL — SIGNIFICANT CHANGE UP (ref 3.8–10.5)
WBC # FLD AUTO: 9.38 K/UL — SIGNIFICANT CHANGE UP (ref 3.8–10.5)
WBC UR QL: < 5 /HPF — SIGNIFICANT CHANGE UP

## 2022-06-11 PROCEDURE — 74176 CT ABD & PELVIS W/O CONTRAST: CPT | Mod: 26

## 2022-06-11 PROCEDURE — 99285 EMERGENCY DEPT VISIT HI MDM: CPT

## 2022-06-11 RX ORDER — DIATRIZOATE MEGLUMINE 180 MG/ML
30 INJECTION, SOLUTION INTRAVESICAL ONCE
Refills: 0 | Status: COMPLETED | OUTPATIENT
Start: 2022-06-11 | End: 2022-06-11

## 2022-06-11 RX ORDER — SODIUM CHLORIDE 9 MG/ML
1000 INJECTION INTRAMUSCULAR; INTRAVENOUS; SUBCUTANEOUS ONCE
Refills: 0 | Status: COMPLETED | OUTPATIENT
Start: 2022-06-11 | End: 2022-06-11

## 2022-06-11 RX ADMIN — DIATRIZOATE MEGLUMINE 30 MILLILITER(S): 180 INJECTION, SOLUTION INTRAVESICAL at 16:40

## 2022-06-11 RX ADMIN — SODIUM CHLORIDE 1000 MILLILITER(S): 9 INJECTION INTRAMUSCULAR; INTRAVENOUS; SUBCUTANEOUS at 16:41

## 2022-06-11 NOTE — ED PROVIDER NOTE - PROGRESS NOTE DETAILS
Pt requesting discharge.  CT prelim is available but CT final read is delayed.  Pt informed of delay and updated on present results.  He would like to be discharge.  He agrees to call this provider tomorrow morning to review results.

## 2022-06-11 NOTE — ED ADULT NURSE NOTE - DISCHARGE DATE/TIME
"Prior to scheduled phlebotomy verified patient identity using patient's name and date of birth.  Lab(s) verified: hematocrit 51.9 today    Peripheral Access:  Accessed: right AC with 20ga autoguard instyte catheter without difficulty.  Positive blood return.  No redness, edema or complaints of discomfort.  Pt tolerated phlebotomy, 500 mls of blood removed via gravity using whole blood collection bag and scale per MD's order.  Pt tolerated procedure without adverse reactions.  Pt denies headache, nausea or discomfort.  IV flushed with 10mls 0.9% Normal Saline after completion of procedure and discontinued per policy with pressure dressing applied.      Pre Vital Signs including O2 saturation documented in \"Vitals\"    Reviewed and provided discharge instructions regarding therapeutic phlebotomy.  Pt verbalized understanding.    Reviewed appointments and copy of schedule given to pt.  Return to clinic in: 2 weeks.       Ambulation steady.  Pt alert and orientated upon discharge.              "
11-Jun-2022 22:00

## 2022-06-11 NOTE — ED PROVIDER NOTE - CLINICAL SUMMARY MEDICAL DECISION MAKING FREE TEXT BOX
57 y/o M presents to ED c/o RLQ pain with movement x 2 days with associated nausea.  Pt well appearing, VSS, afebrile. Abd soft, + RLQ ttp to deep palpation.  Labs wnl, + bacteruria with minimal hematuria.  CT abd/pelvis 55 y/o M presents to ED c/o RLQ pain with movement x 2 days with associated nausea.  Pt well appearing, VSS, afebrile. Abd soft, + RLQ ttp to deep palpation.  Labs wnl, + bacteruria with minimal hematuria.  CT abd/pelvis shows bladder wall thickening.  He denies UTI symptoms.  UA shows bacteruria.  Urine culture sent.

## 2022-06-11 NOTE — ED PROVIDER NOTE - PHYSICAL EXAMINATION
Constitutional:  Well appearing, awake, alert, oriented to person, place, time/situation and in no apparent distress  Head:  NC/AT, symmetric, neck supple  ENMT: Airway patent, nasal mucosa clear, mouth with normal mucosa, throat has no vesicles, no oropharyngeal exudates and uvula is midline  Eyes:  Clear bilaterally, pupils equal, round and reactive to light  Cardiac:  Normal rate, regular rhythm. Heart sounds S1,S2.  No murmurs, rubs or gallops.  No JVD.  Respiratory:  Breath sounds clear and equal bilaterally  GI:   Abd soft, non-distended, + RLQ ttp, no rebound, no guarding  :  no CVAT  MSK:  Spine appears normal, range of motion is not limited, no muscle or joint tenderness  Neuro:  Alert and oriented, no focal deficits, no motor or sensory deficits  Skin:  Skin normal color for race, warm, dry and intact.  No evidence of rash  Psych:  Normal mood and affect, no apparent risk to self or others.  Heme:  No adenopathy or splenomegaly.

## 2022-06-11 NOTE — ED PROVIDER NOTE - PATIENT PORTAL LINK FT
You can access the FollowMyHealth Patient Portal offered by [facility] by registering at the following website: [email or web address]. By joining [facility]’s FollowMyHealth portal, you will also be able to view your health information using other applications (apps) compatible with our system.

## 2022-06-11 NOTE — ED PROVIDER NOTE - OBJECTIVE STATEMENT
55 y/o M with PMH of MI, HLD, anxiety, PSH of bilat inguinal hernia repairs, presents to ED, sent in from urgent care, c/o intermittent RLQ pain x 2 days with associated nausea.  The pain is reproduced with movement.  LBM was this am.    Pt denies trauma/falls, fevers/chills, neck or back pain, headache, visual changes, sore throat, chest pain, palpitations, cough, SOB, vomiting, diarrhea, dysuria, hematuria, weakness, dizziness, numbness, lower extremity swelling, rash, sick contacts, recent hospitalizations, recent travels.

## 2022-06-11 NOTE — ED PROVIDER NOTE - NS ED ROS FT
Constitutional:  no fever, no chills  Eyes:  no discharge, no irritations, no pain, no redness, visual changes  Ears:  no ear pain, no ear drainage,  no hearing problems  Nose:  no nasal congestion, no nasal drainage  Mouth/Throat:  no hoarseness and no throat pain  Neck:  no stiffness, no pain, no lumps, no swollen glands  Cardiac:  no chest pain, no edema  Respiratory:  no cough, no shortness of breath  GI:+ abdominal pain, no bloating, no constipation, no diarrhea,+ nausea, no vomiting  :  no dysuria, no urinary frequency, no hematuria, no discharge  MSK:  no back pain, no msk pain, no weakness  NEURO:  no headache, no weakness, no numbness  Skin:  no lesions, no pruritis, no jaundice, no bruising, no rash  Psych:  no known mental health issues  Endocrine:  no diabetes, no thyroid issues

## 2022-06-12 LAB
CULTURE RESULTS: SIGNIFICANT CHANGE UP
SPECIMEN SOURCE: SIGNIFICANT CHANGE UP

## 2022-06-13 DIAGNOSIS — F41.9 ANXIETY DISORDER, UNSPECIFIED: ICD-10-CM

## 2022-06-13 DIAGNOSIS — R10.31 RIGHT LOWER QUADRANT PAIN: ICD-10-CM

## 2022-06-13 DIAGNOSIS — I25.2 OLD MYOCARDIAL INFARCTION: ICD-10-CM

## 2022-06-13 DIAGNOSIS — E78.5 HYPERLIPIDEMIA, UNSPECIFIED: ICD-10-CM

## 2022-06-13 DIAGNOSIS — Z79.82 LONG TERM (CURRENT) USE OF ASPIRIN: ICD-10-CM

## 2022-06-13 DIAGNOSIS — R31.9 HEMATURIA, UNSPECIFIED: ICD-10-CM

## 2022-06-13 DIAGNOSIS — R11.0 NAUSEA: ICD-10-CM

## 2022-06-13 DIAGNOSIS — R82.71 BACTERIURIA: ICD-10-CM

## 2022-06-13 DIAGNOSIS — Z20.822 CONTACT WITH AND (SUSPECTED) EXPOSURE TO COVID-19: ICD-10-CM

## 2022-06-13 DIAGNOSIS — N40.0 BENIGN PROSTATIC HYPERPLASIA WITHOUT LOWER URINARY TRACT SYMPTOMS: ICD-10-CM

## 2022-07-14 ENCOUNTER — APPOINTMENT (OUTPATIENT)
Dept: UROLOGY | Facility: CLINIC | Age: 57
End: 2022-07-14

## 2022-07-14 VITALS
SYSTOLIC BLOOD PRESSURE: 126 MMHG | TEMPERATURE: 97.34 F | WEIGHT: 180 LBS | BODY MASS INDEX: 25.2 KG/M2 | HEART RATE: 64 BPM | DIASTOLIC BLOOD PRESSURE: 63 MMHG | HEIGHT: 71 IN

## 2022-07-14 PROCEDURE — 99214 OFFICE O/P EST MOD 30 MIN: CPT

## 2022-07-14 NOTE — HISTORY OF PRESENT ILLNESS
[FreeTextEntry1] : 56M with hx of BPH and urinary retention, now out of retention with flomax and finasteride\par voiding well, no incomplete emptying, nocturia x1\par Good QOL\par CTAP 6/11/22 ~70cc prostate, bladder decompressed on imaging\par +retrograde ejaculation\par \par Wants to start family, no unprotected intercourse yet\par 43yo wife, fam hx of late pregnancy, pending female fertility evaluation\par no previous pregnancies,\par \par anxiety\par atorvastatin, zoloft\par no gu malignancy in family

## 2022-07-14 NOTE — ASSESSMENT
[FreeTextEntry1] : hypogonadisms\par on finasterdie and floamx\par due to attempt pregnancy\par recommend IVF\par for SA \par given female age if sperm in ejaculate recommend proceed to IVF\par check baseline TT FSH LH E2\par if avoiding IVF will consider dc finasteride and eval for varicocelectomy\par

## 2022-07-18 NOTE — ED PROVIDER NOTE - NSICDXPASTMEDICALHX_GEN_ALL_CORE_FT
Neuro
PAST MEDICAL HISTORY:  BPH (benign prostatic hyperplasia)     HLD (hyperlipidemia)     MI (myocardial infarction)

## 2022-08-02 ENCOUNTER — APPOINTMENT (OUTPATIENT)
Dept: UROLOGY | Facility: CLINIC | Age: 57
End: 2022-08-02

## 2022-08-11 ENCOUNTER — APPOINTMENT (OUTPATIENT)
Dept: UROLOGY | Facility: CLINIC | Age: 57
End: 2022-08-11

## 2023-01-19 NOTE — DISCHARGE NOTE NURSING/CASE MANAGEMENT/SOCIAL WORK - WILL THE PATIENT ACCEPT THE PFIZER COVID-19 VACCINE IF ELIGIBLE AND IT IS AVAILABLE?
Otolaryngologist Procedure Text (A): After obtaining clear surgical margins the patient was sent to otolaryngology for surgical repair.  The patient understands they will receive post-surgical care and follow-up from the referring physician's office. Not applicable

## 2023-04-07 DIAGNOSIS — Z31.41 ENCOUNTER FOR FERTILITY TESTING: ICD-10-CM

## 2023-05-10 ENCOUNTER — NON-APPOINTMENT (OUTPATIENT)
Age: 58
End: 2023-05-10

## 2023-05-17 ENCOUNTER — NON-APPOINTMENT (OUTPATIENT)
Age: 58
End: 2023-05-17

## 2023-05-18 ENCOUNTER — APPOINTMENT (OUTPATIENT)
Dept: UROLOGY | Facility: CLINIC | Age: 58
End: 2023-05-18

## 2023-05-31 NOTE — END OF VISIT
Body Location Override (Optional - Billing Will Still Be Based On Selected Body Map Location If Applicable): left foot Mohs Case Number:  [Time Spent: ___ minutes] : I have spent [unfilled] minutes of time on the encounter. Date Of Previous Biopsy (Optional): 4/12/23 Previous Accession (Optional): [id number] Biopsy Photograph Reviewed: Yes Referring Physician (Optional): Frank Consent Type: Consent 1 (Standard) Eye Shield Used: No Surgeon Performing Repair (Optional): Fortino Chandler MD Initial Size Of Lesion: 0.3 Number Of Stages: 1 Primary Defect Length In Cm (Final Defect Size - Required For Flaps/Grafts): 0.5 Repair Type: No repair - secondary intention Oculoplastic Surgeon Procedure Text (A): After obtaining clear surgical margins the patient was sent to oculoplastics for surgical repair.  The patient understands they will receive post-surgical care and follow-up from the referring physician's office. Oculoplastic Surgeon Procedure Text (B): After obtaining clear surgical margins the patient was sent to oculoplastics for surgical repair.  The patient understands they will receive post-surgical care and follow-up from the referring physician's office. Otolaryngologist Procedure Text (A): After obtaining clear surgical margins the patient was sent to otolaryngology for surgical repair.  The patient understands they will receive post-surgical care and follow-up from the referring physician's office. Otolaryngologist Procedure Text (B): After obtaining clear surgical margins the patient was sent to otolaryngology for surgical repair.  The patient understands they will receive post-surgical care and follow-up from the referring physician's office. Plastic Surgeon (A): Dr. Uriah Frias Plastic Surgeon Procedure Text (A): After obtaining clear surgical margins the patient was sent to Dr. Uriah Frias, Oral and Maxillofacial Surgeon for surgical repair.  The patient understands they will receive post-surgical care and follow-up from the referring physician's office. Plastic Surgeon Procedure Text (B): After obtaining clear surgical margins the patient was sent to plastics for surgical repair.  The patient understands they will receive post-surgical care and follow-up from the referring physician's office. Plastic Surgeon Procedure Text (C): After obtaining clear surgical margins the patient was sent to plastics for surgical repair.  The patient understands they will receive post-surgical care and follow-up from the referring physician's office. Mid-Level Procedure Text (A): After obtaining clear surgical margins the patient was sent to a mid-level provider for surgical repair.  The patient understands they will receive post-surgical care and follow-up from the mid-level provider. Mid-Level Procedure Text (B): After obtaining clear surgical margins the patient was sent to a mid-level provider for surgical repair.  The patient understands they will receive post-surgical care and follow-up from the mid-level provider. Provider (A): Dr. Uriah Frias Provider (C): Dr. Uriah Frias Provider Procedure Text (A): After obtaining clear surgical margins the defect was repaired by another provider. Asc Procedure Text (A): After obtaining clear surgical margins the patient was sent to an ASC for surgical repair.  The patient understands they will receive post-surgical care and follow-up from the ASC physician. Asc Procedure Text (B): After obtaining clear surgical margins the patient was sent to an ASC for surgical repair.  The patient understands they will receive post-surgical care and follow-up from the ASC physician. Simple / Intermediate / Complex Repair - Final Wound Length In Cm: 0 Suturegard Retention Suture: 2-0 Nylon Retention Suture Bite Size: 3 mm Length To Time In Minutes Device Was In Place: 10 Undermining Type: Entire Wound Debridement Text: The wound edges were debrided prior to proceeding with the closure to facilitate wound healing. Helical Rim Text: The closure involved the helical rim. Vermilion Border Text: The closure involved the vermilion border. Nostril Rim Text: The closure involved the nostril rim. Retention Suture Text: Retention sutures were placed to support the closure and prevent dehiscence. Area H Indication Text: Tumors in this location are included in Area H (eyelids, eyebrows, nose, lips, chin, ear, pre-auricular, post-auricular, temple, genitalia, hands, feet, ankles and areola).  Tissue conservation is critical in these anatomic locations. Area M Indication Text: Tumors in this location are included in Area M (cheek, forehead, scalp, neck, jawline and pretibial skin).  Mohs surgery is indicated for tumors in these anatomic locations. Area L Indication Text: Tumors in this location are included in Area L (trunk and extremities).  Mohs surgery is indicated for larger tumors, or tumors with aggressive histologic features, in these anatomic locations. Tumor Debulked?: scalpel Depth Of Tumor Invasion (For Histology): tumor not visualized Perineural Invasion (For Histology - Be Specific If Possible): absent Surgical Defect Length In Cm (Optional): .5 Special Stains Stage 1 - Results: Base On Clearance Noted Above Stage 2: Additional Anesthesia Type: 1% lidocaine with epinephrine Staging Info: By selecting yes to the question above you will include information on AJCC 8 tumor staging in your Mohs note. Information on tumor staging will be automatically added for SCCs on the head and neck. AJCC 8 includes tumor size, tumor depth, perineural involvement and bone invasion. Tumor Depth: Less than 6mm from granular layer and no invasion beyond the subcutaneous fat Was The Patient On Physician Recommended Anticoagulation Therapy?: Please Select the Appropriate Response Medical Necessity Statement: Based on my medical judgement, Mohs surgery is the most appropriate treatment for this cancer compared to other treatments. Alternatives Discussed Intro (Do Not Add Period): I discussed alternative treatments to Mohs surgery and specifically discussed the risks and benefits of Consent 1/Introductory Paragraph: The rationale for Mohs was explained to the patient and consent was obtained. The risks, benefits and alternatives to therapy were discussed in detail. Specifically, the risks of infection, scarring, bleeding, prolonged wound healing, incomplete removal, allergy to anesthesia, nerve injury and recurrence were addressed. Prior to the procedure, the treatment site was clearly identified and confirmed by the patient. All components of Universal Protocol/PAUSE Rule completed.\\n\\nThe decision was made to proceed with Mohs/excision today as delaying treatment mayincrease risk for potentially serious adverse medical consequences.  Further, this procedure will not deplete hospital capacity or PPE needed tofight COVID-19. Consent 2/Introductory Paragraph: Mohs surgery was explained to the patient and consent was obtained. The risks, benefits and alternatives to therapy were discussed in detail. Specifically, the risks of infection, scarring, bleeding, prolonged wound healing, incomplete removal, allergy to anesthesia, nerve injury and recurrence were addressed. Prior to the procedure, the treatment site was clearly identified and confirmed by the patient. All components of Universal Protocol/PAUSE Rule completed. Consent 3/Introductory Paragraph: I gave the patient a chance to ask questions they had about the procedure.  Following this I explained the Mohs procedure and consent was obtained. The risks, benefits and alternatives to therapy were discussed in detail. Specifically, the risks of infection, scarring, bleeding, prolonged wound healing, incomplete removal, allergy to anesthesia, nerve injury and recurrence were addressed. Prior to the procedure, the treatment site was clearly identified and confirmed by the patient. All components of Universal Protocol/PAUSE Rule completed. Consent (Temporal Branch)/Introductory Paragraph: The rationale for Mohs was explained to the patient and consent was obtained. The risks, benefits and alternatives to therapy were discussed in detail. Specifically, the risks of damage to the temporal branch of the facial nerve, infection, scarring, bleeding, prolonged wound healing, incomplete removal, allergy to anesthesia, and recurrence were addressed. Prior to the procedure, the treatment site was clearly identified and confirmed by the patient. All components of Universal Protocol/PAUSE Rule completed. Consent (Marginal Mandibular)/Introductory Paragraph: The rationale for Mohs was explained to the patient and consent was obtained. The risks, benefits and alternatives to therapy were discussed in detail. Specifically, the risks of damage to the marginal mandibular branch of the facial nerve, infection, scarring, bleeding, prolonged wound healing, incomplete removal, allergy to anesthesia, and recurrence were addressed. Prior to the procedure, the treatment site was clearly identified and confirmed by the patient. All components of Universal Protocol/PAUSE Rule completed. Consent (Spinal Accessory)/Introductory Paragraph: The rationale for Mohs was explained to the patient and consent was obtained. The risks, benefits and alternatives to therapy were discussed in detail. Specifically, the risks of damage to the spinal accessory nerve, infection, scarring, bleeding, prolonged wound healing, incomplete removal, allergy to anesthesia, and recurrence were addressed. Prior to the procedure, the treatment site was clearly identified and confirmed by the patient. All components of Universal Protocol/PAUSE Rule completed. Consent (Near Eyelid Margin)/Introductory Paragraph: The rationale for Mohs was explained to the patient and consent was obtained. The risks, benefits and alternatives to therapy were discussed in detail. Specifically, the risks of ectropion or eyelid deformity, infection, scarring, bleeding, prolonged wound healing, incomplete removal, allergy to anesthesia, nerve injury and recurrence were addressed. Prior to the procedure, the treatment site was clearly identified and confirmed by the patient. All components of Universal Protocol/PAUSE Rule completed. Consent (Ear)/Introductory Paragraph: The rationale for Mohs was explained to the patient and consent was obtained. The risks, benefits and alternatives to therapy were discussed in detail. Specifically, the risks of ear deformity, infection, scarring, bleeding, prolonged wound healing, incomplete removal, allergy to anesthesia, nerve injury and recurrence were addressed. Prior to the procedure, the treatment site was clearly identified and confirmed by the patient. All components of Universal Protocol/PAUSE Rule completed. Consent (Nose)/Introductory Paragraph: The rationale for Mohs was explained to the patient and consent was obtained. The risks, benefits and alternatives to therapy were discussed in detail. Specifically, the risks of nasal deformity, changes in the flow of air through the nose, infection, scarring, bleeding, prolonged wound healing, incomplete removal, allergy to anesthesia, nerve injury and recurrence were addressed. Prior to the procedure, the treatment site was clearly identified and confirmed by the patient. All components of Universal Protocol/PAUSE Rule completed. Consent (Lip)/Introductory Paragraph: The rationale for Mohs was explained to the patient and consent was obtained. The risks, benefits and alternatives to therapy were discussed in detail. Specifically, the risks of lip deformity, changes in the oral aperture, infection, scarring, bleeding, prolonged wound healing, incomplete removal, allergy to anesthesia, nerve injury and recurrence were addressed. Prior to the procedure, the treatment site was clearly identified and confirmed by the patient. All components of Universal Protocol/PAUSE Rule completed. Consent (Scalp)/Introductory Paragraph: The rationale for Mohs was explained to the patient and consent was obtained. The risks, benefits and alternatives to therapy were discussed in detail. Specifically, the risks of changes in hair growth pattern secondary to repair, infection, scarring, bleeding, prolonged wound healing, incomplete removal, allergy to anesthesia, nerve injury and recurrence were addressed. Prior to the procedure, the treatment site was clearly identified and confirmed by the patient. All components of Universal Protocol/PAUSE Rule completed. Detail Level: Detailed Postop Diagnosis: same Surgeon: Fortino Chandler MD Anesthesia Type: 1% lidocaine with 1:100,000 epinephrine and 408mcg clindamycin/ml and a 1:10 solution of 8.4% sodium bicarbonate Anesthesia Volume In Cc: 3 Hemostasis: Electrocoagulation Estimated Blood Loss (Cc): minimal Repair Anesthesia Method: local infiltration Brow Lift Text: A midfrontal incision was made medially to the defect to allow access to the tissues just superior to the left eyebrow. Following careful dissection inferiorly in a supraperiosteal plane to the level of the left eyebrow, several 3-0 monocryl sutures were used to resuspend the eyebrow orbicularis oculi muscular unit to the superior frontal bone periosteum. This resulted in an appropriate reapproximation of static eyebrow symmetry and correction of the left brow ptosis. Epidermal Closure: running horizontal mattress Additional Epidermal Closure (Optional): simple interrupted Suturegard Intro: Intraoperative tissue expansion was performed, utilizing the SUTUREGARD device, in order to reduce wound tension. Suturegard Body: The suture ends were repeatedly re-tightened and re-clamped to achieve the desired tissue expansion. Hemigard Intro: Due to skin fragility and wound tension, it was decided to use HEMIGARD adhesive retention suture devices to permit a linear closure. The skin was cleaned and dried for a 6cm distance away from the wound. Excessive hair, if present, was removed to allow for adhesion. Hemigard Postcare Instructions: The HEMIGARD strips are to remain completely dry for at least 5-7 days. Donor Site Anesthesia Type: same as repair anesthesia Graft Donor Site Dermal Sutures (Optional): 4-0 Monocryl Graft Donor Site Epidermal Sutures (Optional): 5-0 Prolene Graft Donor Site Bandage (Optional-Leave Blank If You Don't Want In Note): Steri-strips and a pressure bandage were applied to the donor site. Closure 2 Information: This tab is for additional flaps and grafts, including complex repair and grafts and complex repair and flaps. You can also specify a different location for the additional defect, if the location is the same you do not need to select a new one. We will insert the automated text for the repair you select below just as we do for solitary flaps and grafts. Please note that at this time if you select a location with a different insurance zone you will need to override the ICD10 and CPT if appropriate. Closure 3 Information: This tab is for additional flaps and grafts above and beyond our usual structured repairs.  Please note if you enter information here it will not currently bill and you will need to add the billing information manually. Closure 4 Information: This tab is for additional flaps and grafts above and beyond our usual structured repairs.  Please note if you enter information here it will not currently bill and you will need to add the billing information manually. Wound Care: Vaseline Dressing: pressure dressing with telfa Wound Care (No Sutures): Petrolatum Dressing (No Sutures): dry sterile dressing Wound Check: 14 days Unna Boot Text: An Unna boot was placed to help immobilize the limb and facilitate more rapid healing. Home Suture Removal Text: Patient was provided instructions on removing sutures and will remove their sutures at home.  If they have any questions or difficulties they will call the office. Post-Care Instructions: I reviewed with the patient in detail post-care instructions. Patient is not to engage in any heavy lifting, exercise, or swimming for the next 14 days. Should the patient develop any fevers, chills, bleeding, severe pain patient will contact the office immediately.\\n\\n\\nDuring the procedure, the following was encountered:  The decision was made to proceed with Mohs/excision today as delaying treatment mayincrease risk for potentially serious adverse medical consequences.  Further, this procedure will not deplete hospital capacity or PPE needed tofight COVID-19. Pain Refusal Text: I offered to prescribe pain medication but the patient refused to take this medication. Mauc Instructions: By selecting yes to the question below the MAUC number will be added into the note.  This will be calculated automatically based on the diagnosis chosen, the size entered, the body zone selected (H,M,L) and the specific indications you chose. You will also have the option to override the Mohs AUC if you disagree with the automatically calculated number and this option is found in the Case Summary tab. Where Do You Want The Question To Include Opioid Counseling Located?: Case Summary Tab Eye Protection Verbiage: Before proceeding with the stage, a plastic scleral shield was inserted. The globe was anesthetized with a few drops of 1% lidocaine with 1:100,000 epinephrine. Then, an appropriate sized scleral shield was chosen and coated with lacrilube ointment. The shield was gently inserted and left in place for the duration of each stage. After the stage was completed, the shield was gently removed. Mohs Method Verbiage: An incision at a 45 degree angle following the standard Mohs approach was done and the specimen was harvested as a microscopic controlled layer. Surgeon/Pathologist Verbiage (Will Incorporate Name Of Surgeon From Intro If Not Blank): operated in two distinct and integrated capacities as the surgeon and pathologist. Mohs Histo Method Verbiage: Each section was then chromacoded and processed in the Mohs lab using the Mohs protocol and submitted for frozen section. Subsequent Stages Histo Method Verbiage: Using a similar technique to that described above, a thin layer of tissue was removed from all areas where tumor was visible on the previous stage.  The tissue was again oriented, mapped, dyed, and processed as above. Mohs Rapid Report Verbiage: The area of clinically evident tumor was marked with skin marking ink and appropriately hatched.  The initial incision was made following the Mohs approach through the skin.  The specimen was taken to the lab, divided into the necessary number of pieces, chromacoded and processed according to the Mohs protocol.  This was repeated in successive stages until a tumor free defect was achieved. Complex Repair Preamble Text (Leave Blank If You Do Not Want): Extensive wide undermining was performed. Intermediate Repair Preamble Text (Leave Blank If You Do Not Want): Undermining was performed with blunt dissection. Non-Graft Cartilage Fenestration Text: The cartilage was fenestrated with a 2mm punch biopsy to help facilitate healing. Graft Cartilage Fenestration Text: The cartilage was fenestrated with a 2mm punch biopsy to help facilitate graft survival and healing. Secondary Intention Text (Leave Blank If You Do Not Want): The defect will heal with secondary intention. No Repair - Repaired With Adjacent Surgical Defect Text (Leave Blank If You Do Not Want): After obtaining clear surgical margins the defect was repaired concurrently with another surgical defect which was in close approximation. Adjacent Tissue Transfer Text: The defect edges were debeveled with a #15 scalpel blade.  Given the location of the defect and the proximity to free margins an adjacent tissue transfer was deemed most appropriate.  Using a sterile surgical marker, an appropriate flap was drawn incorporating the defect and placing the expected incisions within the relaxed skin tension lines where possible.    The area thus outlined was incised deep to adipose tissue with a #15 scalpel blade.  The skin margins were undermined to an appropriate distance in all directions utilizing iris scissors. Advancement Flap (Single) Text: The defect edges were debeveled with a #15 scalpel blade.  Given the location of the defect and the proximity to free margins a single advancement flap was deemed most appropriate.  Using a sterile surgical marker, an appropriate advancement flap was drawn incorporating the defect and placing the expected incisions within the relaxed skin tension lines where possible.    The area thus outlined was incised deep to adipose tissue with a #15 scalpel blade.  The skin margins were undermined to an appropriate distance in all directions utilizing iris scissors. Advancement Flap (Double) Text: The defect edges were debeveled with a #15 scalpel blade.  Given the location of the defect and the proximity to free margins a double advancement flap was deemed most appropriate.  Using a sterile surgical marker, the appropriate advancement flaps were drawn incorporating the defect and placing the expected incisions within the relaxed skin tension lines where possible.    The area thus outlined was incised deep to adipose tissue with a #15 scalpel blade.  The skin margins were undermined to an appropriate distance in all directions utilizing iris scissors. Burow's Advancement Flap Text: The defect edges were debeveled with a #15 scalpel blade.  Given the location of the defect and the proximity to free margins a Burow's advancement flap was deemed most appropriate.  Using a sterile surgical marker, the appropriate advancement flap was drawn incorporating the defect and placing the expected incisions within the relaxed skin tension lines where possible.    The area thus outlined was incised deep to adipose tissue with a #15 scalpel blade.  The skin margins were undermined to an appropriate distance in all directions utilizing iris scissors. Chonodrocutaneous Helical Advancement Flap Text: The defect edges were debeveled with a #15 scalpel blade.  Given the location of the defect and the proximity to free margins a chondrocutaneous helical advancement flap was deemed most appropriate.  Using a sterile surgical marker, the appropriate advancement flap was drawn incorporating the defect and placing the expected incisions within the relaxed skin tension lines where possible.    The area thus outlined was incised deep to adipose tissue with a #15 scalpel blade.  The skin margins were undermined to an appropriate distance in all directions utilizing iris scissors. Crescentic Advancement Flap Text: The defect edges were debeveled with a #15 scalpel blade.  Given the location of the defect and the proximity to free margins a crescentic advancement flap was deemed most appropriate.  Using a sterile surgical marker, the appropriate advancement flap was drawn incorporating the defect and placing the expected incisions within the relaxed skin tension lines where possible.    The area thus outlined was incised deep to adipose tissue with a #15 scalpel blade.  The skin margins were undermined to an appropriate distance in all directions utilizing iris scissors. A-T Advancement Flap Text: The defect edges were debeveled with a #15 scalpel blade.  Given the location of the defect, shape of the defect and the proximity to free margins an A-T advancement flap was deemed most appropriate.  Using a sterile surgical marker, an appropriate advancement flap was drawn incorporating the defect and placing the expected incisions within the relaxed skin tension lines where possible.    The area thus outlined was incised deep to adipose tissue with a #15 scalpel blade.  The skin margins were undermined to an appropriate distance in all directions utilizing iris scissors. O-T Advancement Flap Text: The defect edges were debeveled with a #15 scalpel blade.  Given the location of the defect, shape of the defect and the proximity to free margins an O-T advancement flap was deemed most appropriate.  Using a sterile surgical marker, an appropriate advancement flap was drawn incorporating the defect and placing the expected incisions within the relaxed skin tension lines where possible.    The area thus outlined was incised deep to adipose tissue with a #15 scalpel blade.  The skin margins were undermined to an appropriate distance in all directions utilizing iris scissors. O-L Flap Text: The defect edges were debeveled with a #15 scalpel blade.  Given the location of the defect, shape of the defect and the proximity to free margins an O-L flap was deemed most appropriate.  Using a sterile surgical marker, an appropriate advancement flap was drawn incorporating the defect and placing the expected incisions within the relaxed skin tension lines where possible.    The area thus outlined was incised deep to adipose tissue with a #15 scalpel blade.  The skin margins were undermined to an appropriate distance in all directions utilizing iris scissors. O-Z Flap Text: The defect edges were debeveled with a #15 scalpel blade.  Given the location of the defect, shape of the defect and the proximity to free margins an O-Z flap was deemed most appropriate.  Using a sterile surgical marker, an appropriate transposition flap was drawn incorporating the defect and placing the expected incisions within the relaxed skin tension lines where possible. The area thus outlined was incised deep to adipose tissue with a #15 scalpel blade.  The skin margins were undermined to an appropriate distance in all directions utilizing iris scissors. Double O-Z Flap Text: The defect edges were debeveled with a #15 scalpel blade.  Given the location of the defect, shape of the defect and the proximity to free margins a Double O-Z flap was deemed most appropriate.  Using a sterile surgical marker, an appropriate transposition flap was drawn incorporating the defect and placing the expected incisions within the relaxed skin tension lines where possible. The area thus outlined was incised deep to adipose tissue with a #15 scalpel blade.  The skin margins were undermined to an appropriate distance in all directions utilizing iris scissors. V-Y Flap Text: The defect edges were debeveled with a #15 scalpel blade.  Given the location of the defect, shape of the defect and the proximity to free margins a V-Y flap was deemed most appropriate.  Using a sterile surgical marker, an appropriate advancement flap was drawn incorporating the defect and placing the expected incisions within the relaxed skin tension lines where possible.    The area thus outlined was incised deep to adipose tissue with a #15 scalpel blade.  The skin margins were undermined to an appropriate distance in all directions utilizing iris scissors. Advancement-Rotation Flap Text: The defect edges were debeveled with a #15 scalpel blade.  Given the location of the defect, shape of the defect and the proximity to free margins an advancement-rotation flap was deemed most appropriate.  Using a sterile surgical marker, an appropriate flap was drawn incorporating the defect and placing the expected incisions within the relaxed skin tension lines where possible. The area thus outlined was incised deep to adipose tissue with a #15 scalpel blade.  The skin margins were undermined to an appropriate distance in all directions utilizing iris scissors. Mercedes Flap Text: The defect edges were debeveled with a #15 scalpel blade.  Given the location of the defect, shape of the defect and the proximity to free margins a Mercedes flap was deemed most appropriate.  Using a sterile surgical marker, an appropriate advancement flap was drawn incorporating the defect and placing the expected incisions within the relaxed skin tension lines where possible. The area thus outlined was incised deep to adipose tissue with a #15 scalpel blade.  The skin margins were undermined to an appropriate distance in all directions utilizing iris scissors. Modified Advancement Flap Text: The defect edges were debeveled with a #15 scalpel blade.  Given the location of the defect, shape of the defect and the proximity to free margins a modified advancement flap was deemed most appropriate.  Using a sterile surgical marker, an appropriate advancement flap was drawn incorporating the defect and placing the expected incisions within the relaxed skin tension lines where possible.    The area thus outlined was incised deep to adipose tissue with a #15 scalpel blade.  The skin margins were undermined to an appropriate distance in all directions utilizing iris scissors. Mucosal Advancement Flap Text: Given the location of the defect, shape of the defect and the proximity to free margins a mucosal advancement flap was deemed most appropriate. Incisions were made with a 15 blade scalpel in the appropriate fashion along the cutaneous vermilion border and the mucosal lip. The remaining actinically damaged mucosal tissue was excised.  The mucosal advancement flap was then elevated to the gingival sulcus with care taken to preserve the neurovascular structures and advanced into the primary defect. Care was taken to ensure that precise realignment of the vermilion border was achieved. Peng Advancement Flap Text: The defect edges were debeveled with a #15 scalpel blade.  Given the location of the defect, shape of the defect and the proximity to free margins a Peng advancement flap was deemed most appropriate.  Using a sterile surgical marker, an appropriate advancement flap was drawn incorporating the defect and placing the expected incisions within the relaxed skin tension lines where possible. The area thus outlined was incised deep to adipose tissue with a #15 scalpel blade.  The skin margins were undermined to an appropriate distance in all directions utilizing iris scissors. Hatchet Flap Text: The defect edges were debeveled with a #15 scalpel blade.  Given the location of the defect, shape of the defect and the proximity to free margins a hatchet flap was deemed most appropriate.  Using a sterile surgical marker, an appropriate hatchet flap was drawn incorporating the defect and placing the expected incisions within the relaxed skin tension lines where possible.    The area thus outlined was incised deep to adipose tissue with a #15 scalpel blade.  The skin margins were undermined to an appropriate distance in all directions utilizing iris scissors. Rotation Flap Text: The defect edges were debeveled with a #15 scalpel blade.  Given the location of the defect, shape of the defect and the proximity to free margins a rotation flap was deemed most appropriate.  Using a sterile surgical marker, an appropriate rotation flap was drawn incorporating the defect and placing the expected incisions within the relaxed skin tension lines where possible.    The area thus outlined was incised deep to adipose tissue with a #15 scalpel blade.  The skin margins were undermined to an appropriate distance in all directions utilizing iris scissors. Bilateral Rotation Flap Text: The defect edges were debeveled with a #15 scalpel blade. Given the location of the defect, shape of the defect and the proximity to free margins a bilateral rotation flap was deemed most appropriate. Using a sterile surgical marker, an appropriate rotation flap was drawn incorporating the defect and placing the expected incisions within the relaxed skin tension lines where possible. The area thus outlined was incised deep to adipose tissue with a #15 scalpel blade. The skin margins were undermined to an appropriate distance in all directions utilizing iris scissors. Following this, the designed flap was carried over into the primary defect and sutured into place. Spiral Flap Text: The defect edges were debeveled with a #15 scalpel blade.  Given the location of the defect, shape of the defect and the proximity to free margins a spiral flap was deemed most appropriate.  Using a sterile surgical marker, an appropriate rotation flap was drawn incorporating the defect and placing the expected incisions within the relaxed skin tension lines where possible. The area thus outlined was incised deep to adipose tissue with a #15 scalpel blade.  The skin margins were undermined to an appropriate distance in all directions utilizing iris scissors. Staged Advancement Flap Text: The defect edges were debeveled with a #15 scalpel blade.  Given the location of the defect, shape of the defect and the proximity to free margins a staged advancement flap was deemed most appropriate.  Using a sterile surgical marker, an appropriate advancement flap was drawn incorporating the defect and placing the expected incisions within the relaxed skin tension lines where possible. The area thus outlined was incised deep to adipose tissue with a #15 scalpel blade.  The skin margins were undermined to an appropriate distance in all directions utilizing iris scissors. Star Wedge Flap Text: The defect edges were debeveled with a #15 scalpel blade.  Given the location of the defect, shape of the defect and the proximity to free margins a star wedge flap was deemed most appropriate.  Using a sterile surgical marker, an appropriate rotation flap was drawn incorporating the defect and placing the expected incisions within the relaxed skin tension lines where possible. The area thus outlined was incised deep to adipose tissue with a #15 scalpel blade.  The skin margins were undermined to an appropriate distance in all directions utilizing iris scissors. Transposition Flap Text: The defect edges were debeveled with a #15 scalpel blade.  Given the location of the defect and the proximity to free margins a transposition flap was deemed most appropriate.  Using a sterile surgical marker, an appropriate transposition flap was drawn incorporating the defect.    The area thus outlined was incised deep to adipose tissue with a #15 scalpel blade.  The skin margins were undermined to an appropriate distance in all directions utilizing iris scissors. Muscle Hinge Flap Text: The defect edges were debeveled with a #15 scalpel blade.  Given the size, depth and location of the defect and the proximity to free margins a muscle hinge flap was deemed most appropriate.  Using a sterile surgical marker, an appropriate hinge flap was drawn incorporating the defect. The area thus outlined was incised with a #15 scalpel blade.  The skin margins were undermined to an appropriate distance in all directions utilizing iris scissors. Mustarde Flap Text: The defect edges were debeveled with a #15 scalpel blade.  Given the size, depth and location of the defect and the proximity to free margins a Mustarde flap was deemed most appropriate.  Using a sterile surgical marker, an appropriate flap was drawn incorporating the defect. The area thus outlined was incised with a #15 scalpel blade.  The skin margins were undermined to an appropriate distance in all directions utilizing iris scissors. Nasal Turnover Hinge Flap Text: The defect edges were debeveled with a #15 scalpel blade.  Given the size, depth, location of the defect and the defect being full thickness a nasal turnover hinge flap was deemed most appropriate.  Using a sterile surgical marker, an appropriate hinge flap was drawn incorporating the defect. The area thus outlined was incised with a #15 scalpel blade. The flap was designed to recreate the nasal mucosal lining and the alar rim. The skin margins were undermined to an appropriate distance in all directions utilizing iris scissors. Nasalis-Muscle-Based Myocutaneous Island Pedicle Flap Text: Using a #15 blade, an incision was made around the donor flap to the level of the nasalis muscle. Wide lateral undermining was then performed in both the subcutaneous plane above the nasalis muscle, and in a submuscular plane just above periosteum. This allowed the formation of a free nasalis muscle axial pedicle (based on the angular artery) which was still attached to the actual cutaneous flap, increasing its mobility and vascular viability. Hemostasis was obtained with pinpoint electrocoagulation. The flap was mobilized into position and the pivotal anchor points positioned and stabilized with buried interrupted sutures. Subcutaneous and dermal tissues were closed in a multilayered fashion with sutures. Tissue redundancies were excised, and the epidermal edges were apposed without significant tension and sutured with sutures. Orbicularis Oris Muscle Flap Text: The defect edges were debeveled with a #15 scalpel blade.  Given that the defect affected the competency of the oral sphincter an obicularis oris muscle flap was deemed most appropriate to restore this competency and normal muscle function.  Using a sterile surgical marker, an appropriate flap was drawn incorporating the defect. The area thus outlined was incised with a #15 scalpel blade. Melolabial Transposition Flap Text: The defect edges were debeveled with a #15 scalpel blade.  Given the location of the defect and the proximity to free margins a melolabial flap was deemed most appropriate.  Using a sterile surgical marker, an appropriate melolabial transposition flap was drawn incorporating the defect.    The area thus outlined was incised deep to adipose tissue with a #15 scalpel blade.  The skin margins were undermined to an appropriate distance in all directions utilizing iris scissors. Rhombic Flap Text: The defect edges were debeveled with a #15 scalpel blade.  Given the location of the defect and the proximity to free margins a rhombic flap was deemed most appropriate.  Using a sterile surgical marker, an appropriate rhombic flap was drawn incorporating the defect.    The area thus outlined was incised deep to adipose tissue with a #15 scalpel blade.  The skin margins were undermined to an appropriate distance in all directions utilizing iris scissors. Rhomboid Transposition Flap Text: The defect edges were debeveled with a #15 scalpel blade.  Given the location of the defect and the proximity to free margins a rhomboid transposition flap was deemed most appropriate.  Using a sterile surgical marker, an appropriate rhomboid flap was drawn incorporating the defect.    The area thus outlined was incised deep to adipose tissue with a #15 scalpel blade.  The skin margins were undermined to an appropriate distance in all directions utilizing iris scissors. Bi-Rhombic Flap Text: The defect edges were debeveled with a #15 scalpel blade.  Given the location of the defect and the proximity to free margins a bi-rhombic flap was deemed most appropriate.  Using a sterile surgical marker, an appropriate rhombic flap was drawn incorporating the defect. The area thus outlined was incised deep to adipose tissue with a #15 scalpel blade.  The skin margins were undermined to an appropriate distance in all directions utilizing iris scissors. Helical Rim Advancement Flap Text: The defect edges were debeveled with a #15 blade scalpel.  Given the location of the defect and the proximity to free margins (helical rim) a double helical rim advancement flap was deemed most appropriate.  Using a sterile surgical marker, the appropriate advancement flaps were drawn incorporating the defect and placing the expected incisions between the helical rim and antihelix where possible.  The area thus outlined was incised through and through with a #15 scalpel blade.  With a skin hook and iris scissors, the flaps were gently and sharply undermined and freed up. Bilateral Helical Rim Advancement Flap Text: The defect edges were debeveled with a #15 blade scalpel.  Given the location of the defect and the proximity to free margins (helical rim) a bilateral helical rim advancement flap was deemed most appropriate.  Using a sterile surgical marker, the appropriate advancement flaps were drawn incorporating the defect and placing the expected incisions between the helical rim and antihelix where possible.  The area thus outlined was incised through and through with a #15 scalpel blade.  With a skin hook and iris scissors, the flaps were gently and sharply undermined and freed up. Ear Star Wedge Flap Text: The defect edges were debeveled with a #15 blade scalpel.  Given the location of the defect and the proximity to free margins (helical rim) an ear star wedge flap was deemed most appropriate.  Using a sterile surgical marker, the appropriate flap was drawn incorporating the defect and placing the expected incisions between the helical rim and antihelix where possible.  The area thus outlined was incised through and through with a #15 scalpel blade. Banner Transposition Flap Text: The defect edges were debeveled with a #15 scalpel blade.  Given the location of the defect and the proximity to free margins a Banner transposition flap was deemed most appropriate.  Using a sterile surgical marker, an appropriate flap drawn around the defect. The area thus outlined was incised deep to adipose tissue with a #15 scalpel blade.  The skin margins were undermined to an appropriate distance in all directions utilizing iris scissors. Bilobed Flap Text: The defect edges were debeveled with a #15 scalpel blade.  Given the location of the defect and the proximity to free margins a bilobe flap was deemed most appropriate.  Using a sterile surgical marker, an appropriate bilobe flap drawn around the defect.    The area thus outlined was incised deep to adipose tissue with a #15 scalpel blade.  The skin margins were undermined to an appropriate distance in all directions utilizing iris scissors. Bilobed Transposition Flap Text: The defect edges were debeveled with a #15 scalpel blade.  Given the location of the defect and the proximity to free margins a bilobed transposition flap was deemed most appropriate.  Using a sterile surgical marker, an appropriate bilobe flap drawn around the defect.    The area thus outlined was incised deep to adipose tissue with a #15 scalpel blade.  The skin margins were undermined to an appropriate distance in all directions utilizing iris scissors. Trilobed Flap Text: The defect edges were debeveled with a #15 scalpel blade.  Given the location of the defect and the proximity to free margins a trilobed flap was deemed most appropriate.  Using a sterile surgical marker, an appropriate trilobed flap drawn around the defect.    The area thus outlined was incised deep to adipose tissue with a #15 scalpel blade.  The skin margins were undermined to an appropriate distance in all directions utilizing iris scissors. Dorsal Nasal Flap Text: The defect edges were debeveled with a #15 scalpel blade.  Given the location of the defect and the proximity to free margins a dorsal nasal flap was deemed most appropriate.  Using a sterile surgical marker, an appropriate dorsal nasal flap was drawn around the defect.    The area thus outlined was incised deep to adipose tissue with a #15 scalpel blade.  The skin margins were undermined to an appropriate distance in all directions utilizing iris scissors. Island Pedicle Flap Text: The defect edges were debeveled with a #15 scalpel blade.  Given the location of the defect, shape of the defect and the proximity to free margins an island pedicle advancement flap was deemed most appropriate.  Using a sterile surgical marker, an appropriate advancement flap was drawn incorporating the defect, outlining the appropriate donor tissue and placing the expected incisions within the relaxed skin tension lines where possible.    The area thus outlined was incised deep to adipose tissue with a #15 scalpel blade.  The skin margins were undermined to an appropriate distance in all directions around the primary defect and laterally outward around the island pedicle utilizing iris scissors.  There was minimal undermining beneath the pedicle flap. Island Pedicle Flap With Canthal Suspension Text: The defect edges were debeveled with a #15 scalpel blade.  Given the location of the defect, shape of the defect and the proximity to free margins an island pedicle advancement flap was deemed most appropriate.  Using a sterile surgical marker, an appropriate advancement flap was drawn incorporating the defect, outlining the appropriate donor tissue and placing the expected incisions within the relaxed skin tension lines where possible. The area thus outlined was incised deep to adipose tissue with a #15 scalpel blade.  The skin margins were undermined to an appropriate distance in all directions around the primary defect and laterally outward around the island pedicle utilizing iris scissors.  There was minimal undermining beneath the pedicle flap. A suspension suture was placed in the canthal tendon to prevent tension and prevent ectropion. Alar Island Pedicle Flap Text: The defect edges were debeveled with a #15 scalpel blade.  Given the location of the defect, shape of the defect and the proximity to the alar rim an island pedicle advancement flap was deemed most appropriate.  Using a sterile surgical marker, an appropriate advancement flap was drawn incorporating the defect, outlining the appropriate donor tissue and placing the expected incisions within the nasal ala running parallel to the alar rim. The area thus outlined was incised with a #15 scalpel blade.  The skin margins were undermined minimally to an appropriate distance in all directions around the primary defect and laterally outward around the island pedicle utilizing iris scissors.  There was minimal undermining beneath the pedicle flap. Double Island Pedicle Flap Text: The defect edges were debeveled with a #15 scalpel blade.  Given the location of the defect, shape of the defect and the proximity to free margins a double island pedicle advancement flap was deemed most appropriate.  Using a sterile surgical marker, an appropriate advancement flap was drawn incorporating the defect, outlining the appropriate donor tissue and placing the expected incisions within the relaxed skin tension lines where possible.    The area thus outlined was incised deep to adipose tissue with a #15 scalpel blade.  The skin margins were undermined to an appropriate distance in all directions around the primary defect and laterally outward around the island pedicle utilizing iris scissors.  There was minimal undermining beneath the pedicle flap. Island Pedicle Flap-Requiring Vessel Identification Text: The defect edges were debeveled with a #15 scalpel blade.  Given the location of the defect, shape of the defect and the proximity to free margins an island pedicle advancement flap was deemed most appropriate.  Using a sterile surgical marker, an appropriate advancement flap was drawn, based on the axial vessel mentioned above, incorporating the defect, outlining the appropriate donor tissue and placing the expected incisions within the relaxed skin tension lines where possible.    The area thus outlined was incised deep to adipose tissue with a #15 scalpel blade.  The skin margins were undermined to an appropriate distance in all directions around the primary defect and laterally outward around the island pedicle utilizing iris scissors.  There was minimal undermining beneath the pedicle flap. Keystone Flap Text: The defect edges were debeveled with a #15 scalpel blade.  Given the location of the defect, shape of the defect a keystone flap was deemed most appropriate.  Using a sterile surgical marker, an appropriate keystone flap was drawn incorporating the defect, outlining the appropriate donor tissue and placing the expected incisions within the relaxed skin tension lines where possible. The area thus outlined was incised deep to adipose tissue with a #15 scalpel blade.  The skin margins were undermined to an appropriate distance in all directions around the primary defect and laterally outward around the flap utilizing iris scissors. O-T Plasty Text: The defect edges were debeveled with a #15 scalpel blade.  Given the location of the defect, shape of the defect and the proximity to free margins an O-T plasty was deemed most appropriate.  Using a sterile surgical marker, an appropriate O-T plasty was drawn incorporating the defect and placing the expected incisions within the relaxed skin tension lines where possible.    The area thus outlined was incised deep to adipose tissue with a #15 scalpel blade.  The skin margins were undermined to an appropriate distance in all directions utilizing iris scissors. O-Z Plasty Text: The defect edges were debeveled with a #15 scalpel blade.  Given the location of the defect, shape of the defect and the proximity to free margins an O-Z plasty (double transposition flap) was deemed most appropriate.  Using a sterile surgical marker, the appropriate transposition flaps were drawn incorporating the defect and placing the expected incisions within the relaxed skin tension lines where possible.    The area thus outlined was incised deep to adipose tissue with a #15 scalpel blade.  The skin margins were undermined to an appropriate distance in all directions utilizing iris scissors.  Hemostasis was achieved with electrocautery.  The flaps were then transposed into place, one clockwise and the other counterclockwise, and anchored with interrupted buried subcutaneous sutures. Double O-Z Plasty Text: The defect edges were debeveled with a #15 scalpel blade.  Given the location of the defect, shape of the defect and the proximity to free margins a Double O-Z plasty (double transposition flap) was deemed most appropriate.  Using a sterile surgical marker, the appropriate transposition flaps were drawn incorporating the defect and placing the expected incisions within the relaxed skin tension lines where possible. The area thus outlined was incised deep to adipose tissue with a #15 scalpel blade.  The skin margins were undermined to an appropriate distance in all directions utilizing iris scissors.  Hemostasis was achieved with electrocautery.  The flaps were then transposed into place, one clockwise and the other counterclockwise, and anchored with interrupted buried subcutaneous sutures. V-Y Plasty Text: The defect edges were debeveled with a #15 scalpel blade.  Given the location of the defect, shape of the defect and the proximity to free margins an V-Y advancement flap was deemed most appropriate.  Using a sterile surgical marker, an appropriate advancement flap was drawn incorporating the defect and placing the expected incisions within the relaxed skin tension lines where possible.    The area thus outlined was incised deep to adipose tissue with a #15 scalpel blade.  The skin margins were undermined to an appropriate distance in all directions utilizing iris scissors. H Plasty Text: Given the location of the defect, shape of the defect and the proximity to free margins a H-plasty was deemed most appropriate for repair.  Using a sterile surgical marker, the appropriate advancement arms of the H-plasty were drawn incorporating the defect and placing the expected incisions within the relaxed skin tension lines where possible. The area thus outlined was incised deep to adipose tissue with a #15 scalpel blade. The skin margins were undermined to an appropriate distance in all directions utilizing iris scissors.  The opposing advancement arms were then advanced into place in opposite direction and anchored with interrupted buried subcutaneous sutures. W Plasty Text: The lesion was extirpated to the level of the fat with a #15 scalpel blade.  Given the location of the defect, shape of the defect and the proximity to free margins a W-plasty was deemed most appropriate for repair.  Using a sterile surgical marker, the appropriate transposition arms of the W-plasty were drawn incorporating the defect and placing the expected incisions within the relaxed skin tension lines where possible.    The area thus outlined was incised deep to adipose tissue with a #15 scalpel blade.  The skin margins were undermined to an appropriate distance in all directions utilizing iris scissors.  The opposing transposition arms were then transposed into place in opposite direction and anchored with interrupted buried subcutaneous sutures. Z Plasty Text: The lesion was extirpated to the level of the fat with a #15 scalpel blade.  Given the location of the defect, shape of the defect and the proximity to free margins a Z-plasty was deemed most appropriate for repair.  Using a sterile surgical marker, the appropriate transposition arms of the Z-plasty were drawn incorporating the defect and placing the expected incisions within the relaxed skin tension lines where possible.    The area thus outlined was incised deep to adipose tissue with a #15 scalpel blade.  The skin margins were undermined to an appropriate distance in all directions utilizing iris scissors.  The opposing transposition arms were then transposed into place in opposite direction and anchored with interrupted buried subcutaneous sutures. Double Z Plasty Text: The lesion was extirpated to the level of the fat with a #15 scalpel blade. Given the location of the defect, shape of the defect and the proximity to free margins a double Z-plasty was deemed most appropriate for repair. Using a sterile surgical marker, the appropriate transposition arms of the double Z-plasty were drawn incorporating the defect and placing the expected incisions within the relaxed skin tension lines where possible. The area thus outlined was incised deep to adipose tissue with a #15 scalpel blade. The skin margins were undermined to an appropriate distance in all directions utilizing iris scissors. The opposing transposition arms were then transposed and carried over into place in opposite direction and anchored with interrupted buried subcutaneous sutures. Zygomaticofacial Flap Text: Given the location of the defect, shape of the defect and the proximity to free margins a zygomaticofacial flap was deemed most appropriate for repair.  Using a sterile surgical marker, the appropriate flap was drawn incorporating the defect and placing the expected incisions within the relaxed skin tension lines where possible. The area thus outlined was incised deep to adipose tissue with a #15 scalpel blade with preservation of a vascular pedicle.  The skin margins were undermined to an appropriate distance in all directions utilizing iris scissors.  The flap was then placed into the defect and anchored with interrupted buried subcutaneous sutures. Cheek Interpolation Flap Text: A decision was made to reconstruct the defect utilizing an interpolation axial flap and a staged reconstruction.  A telfa template was made of the defect.  This telfa template was then used to outline the Cheek Interpolation flap.  The donor area for the pedicle flap was then injected with anesthesia.  The flap was excised through the skin and subcutaneous tissue down to the layer of the underlying musculature.  The interpolation flap was carefully excised within this deep plane to maintain its blood supply.  The edges of the donor site were undermined.   The donor site was closed in a primary fashion.  The pedicle was then rotated into position and sutured.  Once the tube was sutured into place, adequate blood supply was confirmed with blanching and refill.  The pedicle was then wrapped with xeroform gauze and dressed appropriately with a telfa and gauze bandage to ensure continued blood supply and protect the attached pedicle. Cheek-To-Nose Interpolation Flap Text: A decision was made to reconstruct the defect utilizing an interpolation axial flap and a staged reconstruction.  A telfa template was made of the defect.  This telfa template was then used to outline the Cheek-To-Nose Interpolation flap.  The donor area for the pedicle flap was then injected with anesthesia.  The flap was excised through the skin and subcutaneous tissue down to the layer of the underlying musculature.  The interpolation flap was carefully excised within this deep plane to maintain its blood supply.  The edges of the donor site were undermined.   The donor site was closed in a primary fashion.  The pedicle was then rotated into position and sutured.  Once the tube was sutured into place, adequate blood supply was confirmed with blanching and refill.  The pedicle was then wrapped with xeroform gauze and dressed appropriately with a telfa and gauze bandage to ensure continued blood supply and protect the attached pedicle. Interpolation Flap Text: A decision was made to reconstruct the defect utilizing an interpolation axial flap and a staged reconstruction.  A telfa template was made of the defect.  This telfa template was then used to outline the interpolation flap.  The donor area for the pedicle flap was then injected with anesthesia.  The flap was excised through the skin and subcutaneous tissue down to the layer of the underlying musculature.  The interpolation flap was carefully excised within this deep plane to maintain its blood supply.  The edges of the donor site were undermined.   The donor site was closed in a primary fashion.  The pedicle was then rotated into position and sutured.  Once the tube was sutured into place, adequate blood supply was confirmed with blanching and refill.  The pedicle was then wrapped with xeroform gauze and dressed appropriately with a telfa and gauze bandage to ensure continued blood supply and protect the attached pedicle. Melolabial Interpolation Flap Text: A decision was made to reconstruct the defect utilizing an interpolation axial flap and a staged reconstruction.  A telfa template was made of the defect.  This telfa template was then used to outline the melolabial interpolation flap.  The donor area for the pedicle flap was then injected with anesthesia.  The flap was excised through the skin and subcutaneous tissue down to the layer of the underlying musculature.  The pedicle flap was carefully excised within this deep plane to maintain its blood supply.  The edges of the donor site were undermined.   The donor site was closed in a primary fashion.  The pedicle was then rotated into position and sutured.  Once the tube was sutured into place, adequate blood supply was confirmed with blanching and refill.  The pedicle was then wrapped with xeroform gauze and dressed appropriately with a telfa and gauze bandage to ensure continued blood supply and protect the attached pedicle. Mastoid Interpolation Flap Text: A decision was made to reconstruct the defect utilizing an interpolation axial flap and a staged reconstruction.  A telfa template was made of the defect.  This telfa template was then used to outline the mastoid interpolation flap.  The donor area for the pedicle flap was then injected with anesthesia.  The flap was excised through the skin and subcutaneous tissue down to the layer of the underlying musculature.  The pedicle flap was carefully excised within this deep plane to maintain its blood supply.  The edges of the donor site were undermined.   The donor site was closed in a primary fashion.  The pedicle was then rotated into position and sutured.  Once the tube was sutured into place, adequate blood supply was confirmed with blanching and refill.  The pedicle was then wrapped with xeroform gauze and dressed appropriately with a telfa and gauze bandage to ensure continued blood supply and protect the attached pedicle. Posterior Auricular Interpolation Flap Text: A decision was made to reconstruct the defect utilizing an interpolation axial flap and a staged reconstruction.  A telfa template was made of the defect.  This telfa template was then used to outline the posterior auricular interpolation flap.  The donor area for the pedicle flap was then injected with anesthesia.  The flap was excised through the skin and subcutaneous tissue down to the layer of the underlying musculature.  The pedicle flap was carefully excised within this deep plane to maintain its blood supply.  The edges of the donor site were undermined.   The donor site was closed in a primary fashion.  The pedicle was then rotated into position and sutured.  Once the tube was sutured into place, adequate blood supply was confirmed with blanching and refill.  The pedicle was then wrapped with xeroform gauze and dressed appropriately with a telfa and gauze bandage to ensure continued blood supply and protect the attached pedicle. Paramedian Forehead Flap Text: A decision was made to reconstruct the defect utilizing an interpolation axial flap and a staged reconstruction.  A telfa template was made of the defect.  This telfa template was then used to outline the paramedian forehead pedicle flap.  The donor area for the pedicle flap was then injected with anesthesia.  The flap was excised through the skin and subcutaneous tissue down to the layer of the underlying musculature.  The pedicle flap was carefully excised within this deep plane to maintain its blood supply.  The edges of the donor site were undermined.   The donor site was closed in a primary fashion.  The pedicle was then rotated into position and sutured.  Once the tube was sutured into place, adequate blood supply was confirmed with blanching and refill.  The pedicle was then wrapped with xeroform gauze and dressed appropriately with a telfa and gauze bandage to ensure continued blood supply and protect the attached pedicle. Abbe Flap (Upper To Lower Lip) Text: The defect of the lower lip was assessed and measured.  Given the location and size of the defect, an Abbe flap was deemed most appropriate.  Using a sterile surgical marker, an appropriate Abbe flap was measured and drawn on the upper lip. Local anesthesia was then infiltrated.  A scalpel was then used to incise the upper lip through and through the skin, vermilion, muscle and mucosa, leaving the flap pedicled on the opposite side.  The flap was then rotated and transferred to the lower lip defect.  The flap was then sutured into place with a three layer technique, closing the orbicularis oris muscle layer with subcutaneous buried sutures, followed by a mucosal layer and an epidermal layer. Abbe Flap (Lower To Upper Lip) Text: The defect of the upper lip was assessed and measured.  Given the location and size of the defect, an Abbe flap was deemed most appropriate.  Using a sterile surgical marker, an appropriate Abbe flap was measured and drawn on the lower lip. Local anesthesia was then infiltrated. A scalpel was then used to incise the upper lip through and through the skin, vermilion, muscle and mucosa, leaving the flap pedicled on the opposite side.  The flap was then rotated and transferred to the lower lip defect.  The flap was then sutured into place with a three layer technique, closing the orbicularis oris muscle layer with subcutaneous buried sutures, followed by a mucosal layer and an epidermal layer. Estlander Flap (Upper To Lower Lip) Text: The defect of the lower lip was assessed and measured.  Given the location and size of the defect, an Estlander flap was deemed most appropriate.  Using a sterile surgical marker, an appropriate Estlander flap was measured and drawn on the upper lip. Local anesthesia was then infiltrated. A scalpel was then used to incise the lateral aspect of the flap, through skin, muscle and mucosa, leaving the flap pedicled medially.  The flap was then rotated and positioned to fill the lower lip defect.  The flap was then sutured into place with a three layer technique, closing the orbicularis oris muscle layer with subcutaneous buried sutures, followed by a mucosal layer and an epidermal layer. Estlander Flap (Lower To Upper Lip) Text: The defect of the lower lip was assessed and measured.  Given the location and size of the defect, an Estlander flap was deemed most appropriate.  Using a sterile surgical marker, an appropriate Estlander flap was measured and drawn on the upper lip. Local anesthesia was then infiltrated. A scalpel was then used to incise the lateral aspect of the flap, through skin, muscle and mucosa, leaving the flap pedicled medially.  The flap was then rotated and positioned to fill the lower lip defect.  The flap was then sutured into place with a three layer technique, closing the orbicularis oris muscle layer with subcutaneous buried sutures, followed by a mucosal layer and an epidermal layer. Cheiloplasty (Less Than 50%) Text: A decision was made to reconstruct the defect with a  cheiloplasty.  The defect was undermined extensively.  Additional obicularis oris muscle was excised with a 15 blade scalpel.  The defect was converted into a full thickness wedge, of less than 50% of the vertical height of the lip, to facilite a better cosmetic result.  Small vessels were then tied off with 5-0 monocyrl. The obicularis oris, superficial fascia, adipose and dermis were then reapproximated.  After the deeper layers were approximated the epidermis was reapproximated with particular care given to realign the vermilion border. Cheiloplasty (Complex) Text: A decision was made to reconstruct the defect with a  cheiloplasty.  The defect was undermined extensively.  Additional obicularis oris muscle was excised with a 15 blade scalpel.  The defect was converted into a full thickness wedge to facilite a better cosmetic result.  Small vessels were then tied off with 5-0 monocyrl. The obicularis oris, superficial fascia, adipose and dermis were then reapproximated.  After the deeper layers were approximated the epidermis was reapproximated with particular care given to realign the vermilion border. Ear Wedge Repair Text: A wedge excision was completed by carrying down an excision through the full thickness of the ear and cartilage with an inward facing Burow's triangle. The wound was then closed in a layered fashion. Full Thickness Lip Wedge Repair (Flap) Text: Given the location of the defect and the proximity to free margins a full thickness wedge repair was deemed most appropriate.  Using a sterile surgical marker, the appropriate repair was drawn incorporating the defect and placing the expected incisions perpendicular to the vermilion border.  The vermilion border was also meticulously outlined to ensure appropriate reapproximation during the repair.  The area thus outlined was incised through and through with a #15 scalpel blade.  The muscularis and dermis were reaproximated with deep sutures following hemostasis. Care was taken to realign the vermilion border before proceeding with the superficial closure.  Once the vermilion was realigned the superfical and mucosal closure was finished. Ftsg Text: The defect edges were debeveled with a #15 scalpel blade.  Given the location of the defect, shape of the defect and the proximity to free margins a full thickness skin graft was deemed most appropriate.  Using a sterile surgical marker, the primary defect shape was transferred to the donor site. The area thus outlined was incised deep to adipose tissue with a #15 scalpel blade.  The harvested graft was then trimmed of adipose tissue until only dermis and epidermis was left.  The skin margins of the secondary defect were undermined to an appropriate distance in all directions utilizing iris scissors.  The secondary defect was closed with interrupted buried subcutaneous sutures.  The skin edges were then re-apposed with running  sutures.  The skin graft was then placed in the primary defect and oriented appropriately. Split-Thickness Skin Graft Text: The defect edges were debeveled with a #15 scalpel blade.  Given the location of the defect, shape of the defect and the proximity to free margins a split thickness skin graft was deemed most appropriate.  Using a sterile surgical marker, the primary defect shape was transferred to the donor site. The split thickness graft was then harvested.  The skin graft was then placed in the primary defect and oriented appropriately. Pinch Graft Text: The defect edges were debeveled with a #15 scalpel blade. Given the location of the defect, shape of the defect and the proximity to free margins a pinch graft was deemed most appropriate. Using a sterile surgical marker, the primary defect shape was transferred to the donor site. The area thus outlined was incised deep to adipose tissue with a #15 scalpel blade.  The harvested graft was then trimmed of adipose tissue until only dermis and epidermis was left. The skin margins of the secondary defect were undermined to an appropriate distance in all directions utilizing iris scissors.  The secondary defect was closed with interrupted buried subcutaneous sutures.  The skin edges were then re-apposed with running  sutures.  The skin graft was then placed in the primary defect and oriented appropriately. Burow's Graft Text: The defect edges were debeveled with a #15 scalpel blade.  Given the location of the defect, shape of the defect, the proximity to free margins and the presence of a standing cone deformity a Burow's skin graft was deemed most appropriate. The standing cone was removed and this tissue was then trimmed to the shape of the primary defect. The adipose tissue was also removed until only dermis and epidermis were left.  The skin margins of the secondary defect were undermined to an appropriate distance in all directions utilizing iris scissors.  The secondary defect was closed with interrupted buried subcutaneous sutures.  The skin edges were then re-apposed with running  sutures.  The skin graft was then placed in the primary defect and oriented appropriately. Cartilage Graft Text: The defect edges were debeveled with a #15 scalpel blade.  Given the location of the defect, shape of the defect, the fact the defect involved a full thickness cartilage defect a cartilage graft was deemed most appropriate.  An appropriate donor site was identified, cleansed, and anesthetized. The cartilage graft was then harvested and transferred to the recipient site, oriented appropriately and then sutured into place.  The secondary defect was then repaired using a primary closure. Composite Graft Text: The defect edges were debeveled with a #15 scalpel blade.  Given the location of the defect, shape of the defect, the proximity to free margins and the fact the defect was full thickness a composite graft was deemed most appropriate.  The defect was outline and then transferred to the donor site.  A full thickness graft was then excised from the donor site. The graft was then placed in the primary defect, oriented appropriately and then sutured into place.  The secondary defect was then repaired using a primary closure. Epidermal Autograft Text: The defect edges were debeveled with a #15 scalpel blade.  Given the location of the defect, shape of the defect and the proximity to free margins an epidermal autograft was deemed most appropriate.  Using a sterile surgical marker, the primary defect shape was transferred to the donor site. The epidermal graft was then harvested.  The skin graft was then placed in the primary defect and oriented appropriately. Dermal Autograft Text: The defect edges were debeveled with a #15 scalpel blade.  Given the location of the defect, shape of the defect and the proximity to free margins a dermal autograft was deemed most appropriate.  Using a sterile surgical marker, the primary defect shape was transferred to the donor site. The area thus outlined was incised deep to adipose tissue with a #15 scalpel blade.  The harvested graft was then trimmed of adipose and epidermal tissue until only dermis was left.  The skin graft was then placed in the primary defect and oriented appropriately. Skin Substitute Text: The defect edges were debeveled with a #15 scalpel blade.  Given the location of the defect, shape of the defect and the proximity to free margins a skin substitute graft was deemed most appropriate.  The graft material was trimmed to fit the size of the defect. The graft was then placed in the primary defect and oriented appropriately. Tissue Cultured Epidermal Autograft Text: The defect edges were debeveled with a #15 scalpel blade.  Given the location of the defect, shape of the defect and the proximity to free margins a tissue cultured epidermal autograft was deemed most appropriate.  The graft was then trimmed to fit the size of the defect.  The graft was then placed in the primary defect and oriented appropriately. Xenograft Text: The defect edges were debeveled with a #15 scalpel blade.  Given the location of the defect, shape of the defect and the proximity to free margins a xenograft was deemed most appropriate.  The graft was then trimmed to fit the size of the defect.  The graft was then placed in the primary defect and oriented appropriately. Purse String (Simple) Text: Given the location of the defect and the characteristics of the surrounding skin a pursestring closure was deemed most appropriate.  Undermining was performed circumfirentially around the surgical defect.  A purstring suture was then placed and tightened. Purse String (Intermediate) Text: Given the location of the defect and the characteristics of the surrounding skin a pursestring intermediate closure was deemed most appropriate.  Undermining was performed circumfirentially around the surgical defect.  A purstring suture was then placed and tightened. Partial Purse String (Simple) Text: Given the location of the defect and the characteristics of the surrounding skin a simple purse string closure was deemed most appropriate.  Undermining was performed circumfirentially around the surgical defect.  A purse string suture was then placed and tightened. Wound tension only allowed a partial closure of the circular defect. Partial Purse String (Intermediate) Text: Given the location of the defect and the characteristics of the surrounding skin an intermediate purse string closure was deemed most appropriate.  Undermining was performed circumfirentially around the surgical defect.  A purse string suture was then placed and tightened. Wound tension only allowed a partial closure of the circular defect. Localized Dermabrasion Text: The patient was draped in routine manner.  Localized dermabrasion using 3 x 17 mm wire brush was performed in routine manner to papillary dermis. This spot dermabrasion is being performed to complete skin cancer reconstruction. It also will eliminate the other sun damaged precancerous cells that are known to be part of the regional effect of a lifetime's worth of sun exposure. This localized dermabrasion is therapeutic and should not be considered cosmetic in any regard. Tarsorrhaphy Text: A tarsorrhaphy was performed using Frost sutures. Intermediate Repair And Flap Additional Text (Will Appearing After The Standard Complex Repair Text): The intermediate repair was not sufficient to completely close the primary defect. The remaining additional defect was repaired with the flap mentioned below. Intermediate Repair And Graft Additional Text (Will Appearing After The Standard Complex Repair Text): The intermediate repair was not sufficient to completely close the primary defect. The remaining additional defect was repaired with the graft mentioned below. Complex Repair And Flap Additional Text (Will Appearing After The Standard Complex Repair Text): The complex repair was not sufficient to completely close the primary defect. The remaining additional defect was repaired with the flap mentioned below. Complex Repair And Graft Additional Text (Will Appearing After The Standard Complex Repair Text): The complex repair was not sufficient to completely close the primary defect. The remaining additional defect was repaired with the graft mentioned below. Manual Repair Warning Statement: We plan on removing the manually selected variable below in favor of our much easier automatic structured text blocks found in the previous tab. We decided to do this to help make the flow better and give you the full power of structured data. Manual selection is never going to be ideal in our platform and I would encourage you to avoid using manual selection from this point on, especially since I will be sunsetting this feature. It is important that you do one of two things with the customized text below. First, you can save all of the text in a word file so you can have it for future reference. Second, transfer the text to the appropriate area in the Library tab. Lastly, if there is a flap or graft type which we do not have you need to let us know right away so I can add it in before the variable is hidden. No need to panic, we plan to give you roughly 6 months to make the change. Same Histology In Subsequent Stages Text: The pattern and morphology of the tumor is as described in the first stage. No Residual Tumor Seen Histology Text: There were no malignant cells seen in the sections examined. Inflammation Suggestive Of Cancer Camouflage Histology Text: There was a dense lymphocytic infiltrate which prevented adequate histologic evaluation of adjacent structures. Bcc Histology Text: Hematoxylin and eosin stained sections reveal basketweave orthokeratosis overlying an atrophic epidermis with island of basaloid cells extending into the dermis from the basal layer.  The dermis contains islands of basaloid cells with a peripheral palisade and surrounding retraction artifact. Bcc Adenoid Histology Text: Hematoxylin and eosin stained sections reveal basketweave orthokeratosis overlying an atrophic epidermis with island of basaloid cells extending into the dermis from the basal layer.  The dermis contains islands of basaloid cells with a peripheral palisade and surrounding retraction artifact. Fibroepithelioma Of Pinkus Histology Text: Hematoxylin and eosin stained sections reveal demonstrates pink strands of squamous epithelium with blue basaloid buds embedded in a fibromyxoid stroma. Scc Histology Text: Hematoxylin and eosin stained sections reveal parakeratosis over an epidermis of variable thickness with disorganization and atypia of the basal and lower spinous layer keratinocytes with areas of full thickness atypia with buds of similar atypical keratinocytes extending into the underlying dermis.  There are islands of similar atypical keratinocytes in the underlying dermis.   Solar elastosis is present in the underlying dermis. Scc Well Differentiated Histology Text: There were nests of squamous epithelial cells arising from the epidermis, extending into the dermis. Some of the malignant cells were large with abundant eosinophilic cytoplasm and variable keratinization. Scc Moderately Differentiated Histology Text: Hematoxylin and eosin stained sections reveal parakeratosis over an epidermis of variable thickness with disorganization and atypia of the basal and lower spinous layer keratinocytes with areas of full thickness atypia with buds of similar atypical keratinocytes extending into the underlying dermis.  There are islands of similar atypical keratinocytes in the underlying dermis.   Solar elastosis is present in the underlying dermis. Scc Ka Subtype Histology Text: Hematoxylin and eosin stained sections reveal glassy islands of atypical squamous epithelium extending into the dermis.  Neutrophilic microabscesses, eosinophils and elastic trapping are noted. Scc In Situ Histology Text: Hematoxylin and eosin stained sections  reveal parakeratosis over an acanthotic epidermis with full thickness disorganization and atypia of the keratinocytes.  Marked solar elastosis is present in the underlying dermis. Merkel Cell Carcinoma Histology Text: Hematoxylin and eosin stained sections reveal dermal sheets and nests of cells with hyperchromatic nuclei, a high mitotic rate, and scant cytoplasm. Afx Histology Text: There were well circumscribed dermal nodule within solar elastosis, composed of epithelioid tumor cells in haphazard or fascicular pattern with bizarre multinucleated pleomorphic cells and frequent, atypical mitotic figures Dfsp Histology Text: Hematoxylin and eosin stained sections reveal storiform spindle cell proliferation with infiltration into the subcutaneous tissue. Desmoplastic Trichoepithelioma Histology Text: Hematoxylin and eosin stained sections  reveal orthokeratosis over an atrophic epidermis.  The dermis contains a dense sclerotic stroma containing small thin, basaloid islands of cells without retraction.  Rare horn cysts and calcifications are identified, as well as a rare focus of granulomatous inflammation in association with a ruptured horn cyst.  There are focal areas that resemble follicular papillae. Desmoplastic Trichilemmoma Histology Text: Hematoxylin and eosin stained sections  reveal a low dome shaped papule covered by orthokeratosis and a slightly atrophic epidermis.  The dermis contains a dense stroma containing basaloid islands of cells without retraction.  Rare horn cysts and calcifications are identified.  There are focal areas that resemble follicular papillae. Extramammary Paget's Disease Histology Text: Hematoxylin and eosin stained sections demonstrates atypical cells throughout the epidermis, focally crushing a normal basal layer.  These features support the diagnosis of Paget?s disease. Sebaceous Carcinoma Histology Text: Hematoxylin and eosin stained sections reveal a nodule of relatively undifferentiated cells with areas of necrosis, many apoptotic cells and scattered mitotic figures.  Sebaceous differentiation is noted in several areas of the tumor. Mart-1 - Positive Histology Text: MART-1 staining demonstrates areas of higher density and clustering of melanocytes with Pagetoid spread upwards within the epidermis. The surgical margins are positive for tumor cells. Mart-1 - Negative Histology Text: MART-1 staining demonstrates a normal density and pattern of melanocytes along the dermal-epidermal junction. The surgical margins are negative for tumor cells. Information: Selecting Yes will display possible errors in your note based on the variables you have selected. This validation is only offered as a suggestion for you. PLEASE NOTE THAT THE VALIDATION TEXT WILL BE REMOVED WHEN YOU FINALIZE YOUR NOTE. IF YOU WANT TO FAX A PRELIMINARY NOTE YOU WILL NEED TO TOGGLE THIS TO 'NO' IF YOU DO NOT WANT IT IN YOUR FAXED NOTE.

## 2024-02-22 ENCOUNTER — NON-APPOINTMENT (OUTPATIENT)
Age: 59
End: 2024-02-22

## 2024-02-23 ENCOUNTER — APPOINTMENT (OUTPATIENT)
Dept: UROLOGY | Facility: CLINIC | Age: 59
End: 2024-02-23
Payer: COMMERCIAL

## 2024-02-23 VITALS
HEART RATE: 79 BPM | BODY MASS INDEX: 25.2 KG/M2 | TEMPERATURE: 97.7 F | WEIGHT: 180 LBS | HEIGHT: 71 IN | DIASTOLIC BLOOD PRESSURE: 60 MMHG | SYSTOLIC BLOOD PRESSURE: 105 MMHG

## 2024-02-23 DIAGNOSIS — E29.1 TESTICULAR HYPOFUNCTION: ICD-10-CM

## 2024-02-23 PROCEDURE — 99214 OFFICE O/P EST MOD 30 MIN: CPT

## 2024-02-23 RX ORDER — FINASTERIDE 5 MG/1
5 TABLET, FILM COATED ORAL DAILY
Qty: 90 | Refills: 3 | Status: ACTIVE | COMMUNITY
Start: 2020-10-08 | End: 1900-01-01

## 2024-02-23 RX ORDER — TAMSULOSIN HYDROCHLORIDE 0.4 MG/1
0.4 CAPSULE ORAL
Qty: 90 | Refills: 3 | Status: ACTIVE | COMMUNITY
Start: 2021-08-20 | End: 1900-01-01

## 2024-02-23 NOTE — PHYSICAL EXAM
[Normal Appearance] : normal appearance [Well Groomed] : well groomed [General Appearance - In No Acute Distress] : no acute distress [Respiration, Rhythm And Depth] : normal respiratory rhythm and effort [Exaggerated Use Of Accessory Muscles For Inspiration] : no accessory muscle use [Urethral Meatus] : meatus normal [Penis Abnormality] : normal circumcised penis [Scrotum] : the scrotum was normal [Testes Tenderness] : no tenderness of the testes [Testes Mass (___cm)] : there were no testicular masses [Prostate Tenderness] : the prostate was not tender [No Prostate Nodules] : no prostate nodules [Prostate Size ___ gm] : prostate size [unfilled] gm [Normal Station and Gait] : the gait and station were normal for the patient's age [] : no rash [No Focal Deficits] : no focal deficits [Oriented To Time, Place, And Person] : oriented to person, place, and time [Affect] : the affect was normal [Mood] : the mood was normal [de-identified] : B/L 20cc testes, possible R grade 1 varicocele. No prostate nodules

## 2024-02-23 NOTE — HISTORY OF PRESENT ILLNESS
[FreeTextEntry1] : AARTI THOMPSON is a 58 year M presenting on 02/23/2024 PMH: BPH/retention, anxiety (atorvastatin, zoloft), OATs   On flomax and finasteride (started 10/2020)  Thinking about conception Wants SA. Wife Jen age 47. Told that her health good. Wife's family h/o late pregnancies, mother natural birth age 52.  Occasional nocturia x1. No bother  Occasional retrograde ejaculation resulting in small volume semen.  No tobacco use No f/h prostate CA   Labs: CTAP 6/11/22 ~70cc prostate  PSA 1.53, 2022 (on finasteride)

## 2024-02-23 NOTE — ASSESSMENT
[FreeTextEntry1] : 59yo M BPH -continue tamsulosin 0.4mg -continue finasteride for now depending on pending labwork -SA -TT, E2, FSH, LH -PSA -recommending IVF for conception, also considering adoption f/u 1 year

## 2024-02-23 NOTE — END OF VISIT
[FreeTextEntry3] : I, Dr. Batista, personally performed the evaluation and management (E/M) services for this established patient who presents today with (a) new problem(s)/exacerbation of (an) existing condition(s). That E/M includes conducting the clinically appropriate interval history &/or exam, assessing all new/exacerbated conditions, and establishing a new plan of care. Today, my ALYCE, Carticipateyarely Fentonins, was here to observe my evaluation and management service for this new problem/exacerbated condition and follow the plan of care established by me going forward

## 2024-05-23 NOTE — ED ADULT NURSE NOTE - CAS TRG GENERAL NORM CIRC DET
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 Presents for worsening [place].    States that a few weeks ago he noticed gradually more difficulty climbing up hills and walking up stairs. He says that 6 months ago he could walk up 10 flights of stairs with no issues, and now even 15 steps prove difficult. No CP with exertion. No palpitations, leg swelling, dizziness, lightheadedness, syncope.    Cardiology called for abnormal EKG and elevated troponin.    At rest, he has no symptoms.    His EKG shows NSR with diffuse TWI. No prior EKGs in [facility], [place], or [employer] available for review  Initial troponin 67, pending repeat. Pro-. Creatinine 0.99. Hgb 7.5 (was in 9s-10s range as of April, 2024).  No prior cardiac workup done. He does not have a cardiologist.      Allergies:  penicillin (Rash)    REVIEW OF SYSTEMS:  All other review of systems is negative unless indicated above.    Physical Exam:  T(F): 98.1 (05-22), Max: 98.4 (05-22)  HR: 100 (05-22) (100 - 115)  BP: 150/77 (05-22) (109/53 - 150/77)  RR: 18 (05-22)  SpO2: 94% (05-22)  GENERAL: No acute distress, well-developed  CHEST/LUNG: Clear to auscultation bilaterally  HEART: Regular rate and rhythm; No murmurs, rubs, or gallops  ABDOMEN: Soft, Nontender, Nondistended  EXTREMITIES:  No edema  NEUROLOGY: AAOx3    CXR: Personally reviewed    Labs: Personally reviewed                        7.5    5.27  )-----------( 620      ( 22 May 2024 17:22 )             23.8     05-22    138  |  107  |  10  ----------------------------<  215<H>  5.0   |  19<L>  |  0.99    Ca    8.0<L>      22 May 2024 17:22  Mg     2.1     05-22    TPro  7.2  /  Alb  4.3  /  TBili  0.9  /  DBili  x   /  AST  31  /  ALT  23  /  AlkPhos  87  05-22    PT/INR - ( 22 May 2024 17:22 )   PT: 17.4 sec;   INR: 1.68 ratio         PTT - ( 22 May 2024 17:22 )  PTT:34.2 sec    CARDIAC MARKERS ( 22 May 2024 17:22 )  67 ng/L / x     / x     / x     / x     / x                     HPI:  72M PMH T2DM, lyme dz, chronic formication, glaucoma, chronic venous insufficiency, MDS/MPN/CML (on luspatercept, jakafi, anagrelide, ASA, imatinib), p/w B[place]. Reports baseline health when 3 wks ago he began to experience B[place], and 3 days ago began to experience cough w/ clear sputum production. Denies fever, chills, HA, dizziness, CP, palpitations, abd pain, n/v/d, constipation, melena/hematochezia, dysuria/hematuria, numbness/weakness/tingling, or other complaint. Reports that he is employed as , seated for prolonged periods. Denies personal or FHx of VTE. Denies sick contact or recent travel. At rest in ED he states that he feels generally well, eager to be discharged. Of note, per HIE pt evaluated by christian 5/20 for [place], L CP w/ exertion, eventually referred to ED.    Pt does not recall medication list.    HR 80s-110s, BP 100s-150s/50s-70s  band neutrophil % 10.6, H/H 7.5/23.8 (normocytic), ; PT/INR prolonged; DDimer 1010; HST 67 -> 54 -> 52,  -> 191, CKMB units 1.6 -> 2.3, CPK Mass Assay% 0.7 -> 1.2, proBNP 520; Ca 7.8 corrected; VBG 7.31/38/23/19 lact 2.1   UA proteinuria  CXR clear lungs (prelim)  POCUS ED TTE no pericardial effusion  CTA chest PE protocol [facility] Acute bilateral pulmonary emboli within the subsegmental arteries of all lobes. No evidence of right heart strain or pulmonary infarct.  s/p initiation of hep gtt, ASA 324mg PO    Evaluated by cardiology, ACS low likelihood, recommend trend Benoit, EKGs, obtain TTE, can get exercise nuclear stress for additional ischemic evaluation, c/s heme for worsened anemia    admit to medicine for further eval/mgt (23 May 2024 03:04)      PAST MEDICAL & SURGICAL HISTORY:  MDS/MPN (myelodysplastic/myeloproliferative neoplasms)      Type 2 diabetes mellitus      H/o Lyme disease      Formication      Glaucoma      Venous insufficiency      No significant past surgical history          Allergies    penicillin (Rash)    Intolerances        MEDICATIONS  (STANDING):  aspirin  chewable 81 milliGRAM(s) Oral daily  dextrose 10% Bolus 125 milliLiter(s) IV Bolus once  dextrose 5%. 1000 milliLiter(s) (100 mL/Hr) IV Continuous <Continuous>  dextrose 5%. 1000 milliLiter(s) (50 mL/Hr) IV Continuous <Continuous>  dextrose 50% Injectable 25 Gram(s) IV Push once  dextrose 50% Injectable 12.5 Gram(s) IV Push once  glucagon  Injectable 1 milliGRAM(s) IntraMuscular once  heparin  Infusion.  Unit(s)/Hr (12 mL/Hr) IV Continuous <Continuous>  insulin lispro (ADMELOG) corrective regimen sliding scale   SubCutaneous at bedtime  insulin lispro (ADMELOG) corrective regimen sliding scale   SubCutaneous three times a day before meals  potassium phosphate IVPB 30 milliMole(s) IV Intermittent once    MEDICATIONS  (PRN):  dextrose Oral Gel 15 Gram(s) Oral once PRN Blood Glucose LESS THAN 70 milliGRAM(s)/deciliter  heparin   Injectable 5500 Unit(s) IV Push every 6 hours PRN For aPTT less than 40  heparin   Injectable 2500 Unit(s) IV Push every 6 hours PRN For aPTT between 40 - 57      FAMILY HISTORY:  No pertinent family history in first degree relatives        SOCIAL HISTORY: No EtOH, no tobacco    REVIEW OF SYSTEMS:    CONSTITUTIONAL: No weakness, fevers or chills  EYES/ENT: No visual changes;  No vertigo or throat pain   NECK: No pain or stiffness  RESPIRATORY: No cough, wheezing, hemoptysis; No shortness of breath  CARDIOVASCULAR: No chest pain or palpitations  GASTROINTESTINAL: No abdominal or epigastric pain. No nausea, vomiting, or hematemesis; No diarrhea or constipation. No melena or hematochezia.  GENITOURINARY: No dysuria, frequency or hematuria  NEUROLOGICAL: No numbness or weakness  SKIN: No itching, burning, rashes, or lesions   All other review of systems is negative unless indicated above.      Weight (kg): 68.4 (05-22 @ 18:13)    T(F): 98.6 (05-23-24 @ 12:40), Max: 99.8 (05-23-24 @ 04:47)  HR: 101 (05-23-24 @ 12:40)  BP: 121/73 (05-23-24 @ 12:40)  RR: 19 (05-23-24 @ 12:40)  SpO2: 95% (05-23-24 @ 12:40)  Wt(kg): --    GENERAL: NAD, well-developed  HEAD:  Atraumatic, Normocephalic  EYES: EOMI, PERRLA, conjunctiva and sclera clear  NECK: Supple, No JVD  CHEST/LUNG: Clear to auscultation bilaterally; No wheeze  HEART: Regular rate and rhythm; No murmurs, rubs, or gallops  ABDOMEN: Soft, Nontender, Nondistended; Bowel sounds present  EXTREMITIES:  2+ Peripheral Pulses, No clubbing, cyanosis, or edema  NEUROLOGY: non-focal  SKIN: No rashes or lesions                          7.6    5.09  )-----------( 609      ( 23 May 2024 05:59 )             24.4       05-23    142  |  108  |  10  ----------------------------<  144<H>  4.4   |  18<L>  |  0.94    Ca    8.4      23 May 2024 05:59  Phos  1.2     05-23  Mg     2.1     05-23    TPro  7.2  /  Alb  4.3  /  TBili  0.9  /  DBili  x   /  AST  31  /  ALT  23  /  AlkPhos  87  05-22      Magnesium: 2.1 mg/dL (05-23 @ 05:59)  Phosphorus: 1.2 mg/dL (05-23 @ 05:59)  Magnesium: 2.1 mg/dL (05-22 @ 17:22)      PT/INR - ( 22 May 2024 17:22 )   PT: 17.4 sec;   INR: 1.68 ratio         PTT - ( 23 May 2024 11:51 )  PTT:122.0 sec     Strong peripheral pulses

## 2024-07-24 NOTE — ED PROVIDER NOTE - NS ED NOTE AC HIGH RISK COUNTRIES
guides you can use.  If you take insulin, you need to know how many grams of carbohydrate are in a meal. This lets you know how much rapid-acting insulin to take before you eat. If you use an insulin pump, you get a constant rate of insulin during the day. So the pump must be programmed at meals to give you extra insulin to cover the rise in blood sugar after meals.  When you know how much carbohydrate you will eat, you can take the right amount of insulin. Or, if you always use the same amount of insulin, you need to make sure that you eat the same amount of carbohydrate at meals.  If you need more help to understand carbohydrate counting and food labels, ask your doctor, dietitian, or diabetes educator.  How can you plan healthy meals?  Here are some tips to get started:  Plan your meals a week at a time. Don't forget to include snacks too.  Use cookbooks or online recipes to plan several main meals. Plan some quick meals for busy nights. You also can double some recipes that freeze well. Then you can save half for other busy nights when you don't have time to cook.  Make sure you have the ingredients you need for your recipes. If you're running low on basic items, put these items on your shopping list too.  List foods that you use to make breakfasts, lunches, and snacks. List plenty of fruits and vegetables.  Post this list on the refrigerator. Add to it as you think of more things you need.  Take the list to the store to do your weekly shopping.  Your doctor or diabetes educator can tell you about resources that can help you get the food you need. Make sure to talk to them if either of the next two statements have ever been \"true\" or \"sometimes true.\"  \"Within the past 12 months, we worried whether our food would run out before we got money to buy more.\"  \"Within the past 12 months, the food we bought just didn't last, and we didn't have money to get more.\"  Follow-up care is a key part of your treatment and  No

## 2025-07-31 ENCOUNTER — NON-APPOINTMENT (OUTPATIENT)
Age: 60
End: 2025-07-31

## 2025-07-31 ENCOUNTER — APPOINTMENT (OUTPATIENT)
Dept: UROLOGY | Facility: CLINIC | Age: 60
End: 2025-07-31
Payer: COMMERCIAL

## 2025-07-31 VITALS
DIASTOLIC BLOOD PRESSURE: 71 MMHG | SYSTOLIC BLOOD PRESSURE: 114 MMHG | TEMPERATURE: 97.5 F | HEART RATE: 69 BPM | WEIGHT: 180 LBS | HEIGHT: 71 IN | BODY MASS INDEX: 25.2 KG/M2

## 2025-07-31 DIAGNOSIS — R35.1 BENIGN PROSTATIC HYPERPLASIA WITH LOWER URINARY TRACT SYMPMS: ICD-10-CM

## 2025-07-31 DIAGNOSIS — N40.1 BENIGN PROSTATIC HYPERPLASIA WITH LOWER URINARY TRACT SYMPMS: ICD-10-CM

## 2025-07-31 PROCEDURE — 51798 US URINE CAPACITY MEASURE: CPT

## 2025-07-31 PROCEDURE — G2211 COMPLEX E/M VISIT ADD ON: CPT | Mod: NC

## 2025-07-31 PROCEDURE — 99214 OFFICE O/P EST MOD 30 MIN: CPT

## 2025-07-31 RX ORDER — TADALAFIL 5 MG/1
5 TABLET ORAL
Qty: 30 | Refills: 3 | Status: ACTIVE | COMMUNITY
Start: 2025-07-31 | End: 1900-01-01

## 2025-08-04 LAB
APPEARANCE: CLEAR
BACTERIA UR CULT: NORMAL
BACTERIA: NEGATIVE /HPF
BILIRUBIN URINE: NEGATIVE
BLOOD URINE: NEGATIVE
CAST: 0 /LPF
COLOR: NORMAL
EPITHELIAL CELLS: 0 /HPF
GLUCOSE QUALITATIVE U: NEGATIVE MG/DL
KETONES URINE: NEGATIVE MG/DL
LEUKOCYTE ESTERASE URINE: NEGATIVE
MICROSCOPIC-UA: NORMAL
NITRITE URINE: NEGATIVE
PH URINE: 7.5
PROTEIN URINE: NEGATIVE MG/DL
RED BLOOD CELLS URINE: 1 /HPF
SPECIFIC GRAVITY URINE: 1.01
UROBILINOGEN URINE: 0.2 MG/DL
WHITE BLOOD CELLS URINE: 0 /HPF